# Patient Record
Sex: FEMALE | Race: OTHER | NOT HISPANIC OR LATINO
[De-identification: names, ages, dates, MRNs, and addresses within clinical notes are randomized per-mention and may not be internally consistent; named-entity substitution may affect disease eponyms.]

---

## 2019-07-15 ENCOUNTER — APPOINTMENT (OUTPATIENT)
Dept: ORTHOPEDIC SURGERY | Facility: CLINIC | Age: 74
End: 2019-07-15
Payer: COMMERCIAL

## 2019-07-15 VITALS — HEIGHT: 63 IN | WEIGHT: 160 LBS | BODY MASS INDEX: 28.35 KG/M2

## 2019-07-15 DIAGNOSIS — Z87.09 PERSONAL HISTORY OF OTHER DISEASES OF THE RESPIRATORY SYSTEM: ICD-10-CM

## 2019-07-15 DIAGNOSIS — Z82.61 FAMILY HISTORY OF ARTHRITIS: ICD-10-CM

## 2019-07-15 DIAGNOSIS — M25.562 PAIN IN RIGHT KNEE: ICD-10-CM

## 2019-07-15 DIAGNOSIS — Z78.9 OTHER SPECIFIED HEALTH STATUS: ICD-10-CM

## 2019-07-15 DIAGNOSIS — M25.561 PAIN IN RIGHT KNEE: ICD-10-CM

## 2019-07-15 PROBLEM — Z00.00 ENCOUNTER FOR PREVENTIVE HEALTH EXAMINATION: Status: ACTIVE | Noted: 2019-07-15

## 2019-07-15 PROCEDURE — 73562 X-RAY EXAM OF KNEE 3: CPT | Mod: 50

## 2019-07-15 PROCEDURE — 20611 DRAIN/INJ JOINT/BURSA W/US: CPT | Mod: 50

## 2019-07-15 PROCEDURE — 99213 OFFICE O/P EST LOW 20 MIN: CPT | Mod: 25

## 2019-07-15 RX ORDER — HYALURONATE SOD, CROSS-LINKED 30 MG/3 ML
30 SYRINGE (ML) INTRAARTICULAR
Qty: 2 | Refills: 0 | Status: ACTIVE | COMMUNITY
Start: 2019-07-15

## 2019-07-15 RX ORDER — THYROID, PORCINE 15 MG/1
15 TABLET ORAL
Refills: 0 | Status: ACTIVE | COMMUNITY

## 2019-07-15 NOTE — PROCEDURE
[de-identified] : PHYSICAL EXAM BILATERAL KNEES\par \par AROM\par RIGHT  0- 125\par LEFT    0- 125 \par \par SPECIAL TESTS\par \par PATELLAR GRIND = GRIND \par DRAWER  = NEG\par LACHMAN = NEG\par MACMURRAY = NEG \par \par MOTOR = GROSSLY INTACT\par SENSORY = GROSSLY INTACT \par \par \par

## 2019-07-15 NOTE — HISTORY OF PRESENT ILLNESS
[Pain Location] : pain [] : right & left knee [8] : a minimum pain level of 8/10 [10] : a maximum pain level of 10/10 [Constant] : ~He/She~ states the symptoms seem to be constant [Walking] : worsened by walking [Ice] : relieved by ice [de-identified] : B LATERAL KNEE PAIN\par CONSTANT DULL SHARP PAIN\par PAIN LEVEL 8-10\par BETTER WITH ICE\par WORSE WITH BENDING WALKING\par STIFFNESS\par \par PRP JANUARY 2019 DID NOT HELP

## 2019-07-15 NOTE — PHYSICAL EXAM
[de-identified] : XRAY LEFT AND RIGHT KNEE:\par 4 views of the knee\par GRADE 2 OA PF, MED, LAT\par No obvious fracture or dislocation. \par Alignment within normal limits\par \par \par

## 2019-07-15 NOTE — REASON FOR VISIT
[Follow-Up Visit] : a follow-up visit for [Knee Pain] : knee pain [FreeTextEntry2] : BI-LATERAL KNEES

## 2019-07-22 ENCOUNTER — APPOINTMENT (OUTPATIENT)
Dept: ORTHOPEDIC SURGERY | Facility: CLINIC | Age: 74
End: 2019-07-22
Payer: COMMERCIAL

## 2019-07-22 VITALS — HEIGHT: 63 IN | WEIGHT: 160 LBS | BODY MASS INDEX: 28.35 KG/M2

## 2019-07-22 PROCEDURE — 20610 DRAIN/INJ JOINT/BURSA W/O US: CPT | Mod: LT

## 2019-07-22 PROCEDURE — 99213 OFFICE O/P EST LOW 20 MIN: CPT | Mod: 25

## 2019-07-22 RX ORDER — DICLOFENAC SODIUM 100 MG/1
100 TABLET, FILM COATED, EXTENDED RELEASE ORAL
Qty: 30 | Refills: 2 | Status: ACTIVE | COMMUNITY
Start: 2019-07-22 | End: 1900-01-01

## 2019-07-22 NOTE — DISCUSSION/SUMMARY
[Medication Risks Reviewed] : Medication risks reviewed [de-identified] : This patient was given a lateral cortisone injection. The patient on diclofenac pressure point or 4 weeks

## 2019-07-22 NOTE — PHYSICAL EXAM
[de-identified] : Left ankle shows mild swelling. Some tenderness laterally. No evidence of instability. Negative anterior drawer. Neurovascular exam is normal

## 2019-07-22 NOTE — REVIEW OF SYSTEMS
[Joint Pain] : joint pain [Arthralgia] : arthralgia [Joint Stiffness] : no joint stiffness [Joint Swelling] : joint swelling [Negative] : Heme/Lymph

## 2019-08-08 ENCOUNTER — OTHER (OUTPATIENT)
Age: 74
End: 2019-08-08

## 2019-08-19 ENCOUNTER — APPOINTMENT (OUTPATIENT)
Dept: ORTHOPEDIC SURGERY | Facility: CLINIC | Age: 74
End: 2019-08-19

## 2019-09-24 ENCOUNTER — OTHER (OUTPATIENT)
Age: 74
End: 2019-09-24

## 2019-09-26 ENCOUNTER — RX RENEWAL (OUTPATIENT)
Age: 74
End: 2019-09-26

## 2019-09-26 RX ORDER — HYALURONATE SOD, CROSS-LINKED 30 MG/3 ML
30 SYRINGE (ML) INTRAARTICULAR
Qty: 1 | Refills: 0 | Status: DISCONTINUED | OUTPATIENT
Start: 2019-09-26 | End: 2019-09-26

## 2019-09-26 RX ORDER — HYALURONATE SOD, CROSS-LINKED 30 MG/3 ML
30 SYRINGE (ML) INTRAARTICULAR
Qty: 1 | Refills: 0 | Status: ACTIVE | OUTPATIENT
Start: 2019-09-26

## 2019-10-21 ENCOUNTER — OTHER (OUTPATIENT)
Age: 74
End: 2019-10-21

## 2019-11-04 ENCOUNTER — APPOINTMENT (OUTPATIENT)
Dept: ORTHOPEDIC SURGERY | Facility: CLINIC | Age: 74
End: 2019-11-04
Payer: COMMERCIAL

## 2019-11-04 PROCEDURE — 99213 OFFICE O/P EST LOW 20 MIN: CPT | Mod: 25

## 2019-11-04 PROCEDURE — 20611 DRAIN/INJ JOINT/BURSA W/US: CPT | Mod: 50

## 2019-11-04 RX ORDER — HYALURONATE SOD, CROSS-LINKED 30 MG/3 ML
30 SYRINGE (ML) INTRAARTICULAR
Qty: 2 | Refills: 0 | Status: ACTIVE | COMMUNITY
Start: 2019-11-04 | End: 1900-01-01

## 2019-11-04 NOTE — HISTORY OF PRESENT ILLNESS
[de-identified] : BILATERAL KNEE PAIN\par FOLLOW UP\par RIGHT WORSE THEN LEFT\par ALL 3 WEEKS AGO\par WORSE WITH GOING DOWN STARS\par SWELLING, BRUISING

## 2020-01-08 ENCOUNTER — OTHER (OUTPATIENT)
Age: 75
End: 2020-01-08

## 2020-01-24 ENCOUNTER — APPOINTMENT (OUTPATIENT)
Dept: ORTHOPEDIC SURGERY | Facility: CLINIC | Age: 75
End: 2020-01-24
Payer: COMMERCIAL

## 2020-01-24 PROCEDURE — 99213 OFFICE O/P EST LOW 20 MIN: CPT | Mod: 25

## 2020-01-24 PROCEDURE — 20611 DRAIN/INJ JOINT/BURSA W/US: CPT | Mod: 50

## 2020-01-24 NOTE — PROCEDURE
[de-identified] : Patient has demonstrated limited relief from NSAIDS, rest, exercises / PT , and after discussion of the risks and benefits, the patient has elected to proceed with a\par n ULTRASOUND GUIDED VISCOSUPPLEMENT injection into the BILATERAL SupeLat PF Knee\par  \par Confirmed that the patient does not have history of prior adverse reactions, active, infections, or relevant allergies. There was no effusion, erythema, or warmth, and the skin was clear\par \par The skin was sterilized with alcohol. Ethyl Chloride was used as a topical anesthetic. Routine sterile technique. \par  \par The KNEE JOINT  was injected utilizing ULTRASOUND GUIDANCE with GEL-ONE 4CC. The injection was completed without complication and a bandage was applied.\par \par The patient tolerated the procedure well and was given post-injection instructions.Rec: Cold therapy, analgesics, avoid heavy activity.\par \par MEDICATION: GEL-ONE 3CC\par \par EFFUSION ASPIRATED  : NONE \par

## 2020-01-24 NOTE — DISCUSSION/SUMMARY
[de-identified] : POST INJECTION INSTRUCTIONS\par \par COLD THERAPY , ANALGESICS PRN\par \par HOME STRETCHING AND EXERCISES QD\par \par START P.T.  2 WEEKS AFTER INJECTION \par \par

## 2020-01-24 NOTE — HISTORY OF PRESENT ILLNESS
[de-identified] : BILATERAL KNEE PAIN\par FOLLOW UP\par FLARE UP DECEMBER 2019\par PREVIOUS CORTISONE INJECTION NOV 4, 2019 HELPFUL FOR 1 MONTH\par PAIN LEVEL 9-10/10\par WORSE GOING DOWN THE STAIRS\par GEL ONE INJECTION TODAY

## 2020-02-05 ENCOUNTER — APPOINTMENT (OUTPATIENT)
Dept: ORTHOPEDIC SURGERY | Facility: CLINIC | Age: 75
End: 2020-02-05
Payer: COMMERCIAL

## 2020-02-05 VITALS — WEIGHT: 160 LBS | HEIGHT: 63 IN | BODY MASS INDEX: 28.35 KG/M2

## 2020-02-05 DIAGNOSIS — M25.512 PAIN IN RIGHT SHOULDER: ICD-10-CM

## 2020-02-05 DIAGNOSIS — M25.511 PAIN IN RIGHT SHOULDER: ICD-10-CM

## 2020-02-05 PROCEDURE — 20611 DRAIN/INJ JOINT/BURSA W/US: CPT | Mod: 50

## 2020-02-05 PROCEDURE — 99214 OFFICE O/P EST MOD 30 MIN: CPT | Mod: 25

## 2020-02-05 PROCEDURE — 73030 X-RAY EXAM OF SHOULDER: CPT | Mod: 50

## 2020-02-05 NOTE — PHYSICAL EXAM
[de-identified] : PHYSICAL EXAM LEFT AND RIGHT   SHOULDER\par \par LEFT  SCAPULAR PROTRACTION\par AROM \par LEFT  120 / 120 / 70 / 0\par RIGHT   130 / 130 / 80 / 20\par TENDER: SA REGION / AC JOINT / GH JOINT / BICEPS GROOVE\par \par SPECIAL TESTING :\par SWANN - POSITIVE \par ROULA - POSITIVE \par SPEED TEST - POSITIVE\par \par NERI - NEGATIVE \par APPREHENSION AND SUPPRESSION - NEGATIVE \par \par RC STRENGTH TESTING \par SS:  5/5\par SUB 5/5\par IS     5/5\par BICEPS  5/5\par \par SENSATION  - GROSSLY INTACT\par \par \par  [de-identified] : LEFT SHOULDER XRAY -  \par NO OBVIOUS FRACTURE , NO SIGNIFICANT OSTEOARTHRITIS, SEPARATION OR DISLOCATION \par TYPE 2B ACROMION \par CSA=35\par \par RIGHT SHOULDER XRAY -  \par NO OBVIOUS FRACTURE , NO SIGNIFICANT OSTEOARTHRITIS, SEPARATION OR DISLOCATION \par TYPE 2B ACROMION \par CSA=32\par

## 2020-02-05 NOTE — HISTORY OF PRESENT ILLNESS
[de-identified] : BILATERAL SHOULDER PAIN\par NOVEMBER 2019 FELL - LEFT SHOULDER HIT DOOR \par LEFT WORSE THEN RIGHT\par PAIN LEVEL 10/10\par WORSE WITH LYING DOWN, REACHING ACROSS AND BEHIND, LIFTING\par BETTER WITH ICE\par STIFFNESS\par

## 2020-02-05 NOTE — DISCUSSION/SUMMARY
[de-identified] : POST INJECTION INSTRUCTIONS\par \par COLD THERAPY , ANALGESICS PRN\par \par HOME STRETCHING AND EXERCISES QD \par \par MRI RIGHT AND LEFT SHOULDERS\par

## 2020-02-05 NOTE — PROCEDURE
[de-identified] : INJECTION RIGHT AND LEFT SHOULDER SA SPACE\par \par Patient has demonstrated limited relief from NSAIDS, rest, exercises / PT, and after discussion of the risks and benefits, the patient has elected to proceed with an ULTRASOUND GUIDED injection into the RIGHT AND LEFT  SUBACROMIAL  SPACE LATERAL APPROACH \par  \par Confirmed that the patient does not have history of prior adverse reactions, active, infections, or relevant allergies. There was no effusion, erythema, or warmth, and the skin was clear\par \par The skin was sterilized with alcohol. Ethyl Chloride was used as a topical anesthetic. Routine sterile technique. \par The site was injected UTILIZING ULTRASOUND GUIDANCE to confirm appropriate placement of the needle-\par with a mixture of medication and local anesthetic. The injection was completed without complication and a bandage was applied.\par  \par The patient tolerated the procedure well and was given post-injection instructions.Rec: Cold therapy, analgesics, avoid heavy activity.\par MEDICATION: 4cc of 1% xylocaine + 10mg of KENALOG\par

## 2020-07-08 ENCOUNTER — APPOINTMENT (OUTPATIENT)
Dept: ORTHOPEDIC SURGERY | Facility: CLINIC | Age: 75
End: 2020-07-08
Payer: COMMERCIAL

## 2020-07-08 VITALS — WEIGHT: 160 LBS | BODY MASS INDEX: 28.35 KG/M2 | HEIGHT: 63 IN

## 2020-07-08 VITALS — TEMPERATURE: 97.8 F

## 2020-07-08 DIAGNOSIS — S49.91XA UNSPECIFIED INJURY OF RIGHT SHOULDER AND UPPER ARM, INITIAL ENCOUNTER: ICD-10-CM

## 2020-07-08 DIAGNOSIS — S49.92XA UNSPECIFIED INJURY OF LEFT SHOULDER AND UPPER ARM, INITIAL ENCOUNTER: ICD-10-CM

## 2020-07-08 PROCEDURE — 99214 OFFICE O/P EST MOD 30 MIN: CPT | Mod: 25

## 2020-07-08 PROCEDURE — 20611 DRAIN/INJ JOINT/BURSA W/US: CPT | Mod: 50

## 2020-07-08 NOTE — PHYSICAL EXAM
[de-identified] : PHYSICAL EXAM LEFT AND RIGHT   SHOULDER\par \par LEFT  SCAPULAR PROTRACTION\par AROM \par LEFT  120 / 120 / 70 / 0\par RIGHT   130 / 130 / 80 / 20\par TENDER: SA REGION / AC JOINT / GH JOINT / BICEPS GROOVE\par \par SPECIAL TESTING :\par SWANN - POSITIVE \par ROULA - POSITIVE \par SPEED TEST - POSITIVE\par \par NERI - NEGATIVE \par APPREHENSION AND SUPPRESSION - NEGATIVE \par \par RC STRENGTH TESTING \par SS:  5/5\par SUB 5/5\par IS     5/5\par BICEPS  5/5\par \par SENSATION  - GROSSLY INTACT\par \par \par

## 2020-07-08 NOTE — HISTORY OF PRESENT ILLNESS
[de-identified] : WAS UNABLE TO GET MRI - AUTHO ISSUES \par \par BILATERAL SHOULDER PAIN\par NOVEMBER 2019 FELL - LEFT SHOULDER HIT DOOR \par LEFT WORSE THEN RIGHT\par PAIN LEVEL 10/10\par WORSE WITH LYING DOWN, REACHING ACROSS AND BEHIND, LIFTING\par BETTER WITH ICE\par STIFFNESS\par

## 2020-07-08 NOTE — DISCUSSION/SUMMARY
[de-identified] : POST INJECTION INSTRUCTIONS\par \par COLD THERAPY , ANALGESICS PRN\par \par HOME STRETCHING AND EXERCISES QD\par \par FOLLOW UP AFTER MRI

## 2020-07-08 NOTE — PROCEDURE
[de-identified] : INJECTION RIGHT AND LEFT SHOULDER SA SPACE\par \par Patient has demonstrated limited relief from NSAIDS, rest, exercises / PT, and after discussion of the risks and benefits, the patient has elected to proceed with an ULTRASOUND GUIDED injection into the RIGHT AND LEFT  SUBACROMIAL  SPACE LATERAL APPROACH \par  \par Confirmed that the patient does not have history of prior adverse reactions, active, infections, or relevant allergies. There was no effusion, erythema, or warmth, and the skin was clear\par \par The skin was sterilized with alcohol. Ethyl Chloride was used as a topical anesthetic. Routine sterile technique. \par The site was injected UTILIZING ULTRASOUND GUIDANCE to confirm appropriate placement of the needle-\par with a mixture of medication and local anesthetic. The injection was completed without complication and a bandage was applied.\par  \par The patient tolerated the procedure well and was given post-injection instructions.Rec: Cold therapy, analgesics, avoid heavy activity.\par MEDICATION: 4cc of 1% xylocaine + 10mg of KENALOG\par

## 2020-07-29 ENCOUNTER — APPOINTMENT (OUTPATIENT)
Dept: ORTHOPEDIC SURGERY | Facility: CLINIC | Age: 75
End: 2020-07-29
Payer: COMMERCIAL

## 2020-07-29 VITALS — TEMPERATURE: 97 F

## 2020-07-29 PROCEDURE — 99213 OFFICE O/P EST LOW 20 MIN: CPT | Mod: 25

## 2020-07-29 PROCEDURE — 20611 DRAIN/INJ JOINT/BURSA W/US: CPT | Mod: LT

## 2020-07-29 NOTE — HISTORY OF PRESENT ILLNESS
[de-identified] : \par BILATERAL SHOULDER PAIN\par NOVEMBER 2019 FELL - LEFT SHOULDER HIT DOOR \par LEFT WORSE THEN RIGHT\par IMPROVING\par WORSE WITH LYING DOWN, REACHING ACROSS AND BEHIND, LIFTING\par BETTER WITH ICE\par STIFFNESS\par

## 2020-07-29 NOTE — PROCEDURE
[de-identified] : INJECTION LEFT SHOULDER SA SPACE\par \par Patient has demonstrated limited relief from NSAIDS, rest, exercises / PT, and after discussion of the risks and benefits, the patient has elected to proceed with an ULTRASOUND GUIDED injection into the LEFT SUBACROMIAL  SPACE LATERAL APPROACH \par  \par Confirmed that the patient does not have history of prior adverse reactions, active, infections, or relevant allergies. There was no effusion, erythema, or warmth, and the skin was clear\par \par The skin was sterilized with alcohol. Ethyl Chloride was used as a topical anesthetic. Routine sterile technique. \par The site was injected UTILIZING ULTRASOUND GUIDANCE to confirm appropriate placement of the needle-\par with a mixture of medication and local anesthetic. The injection was completed without complication and a bandage was applied.\par  \par The patient tolerated the procedure well and was given post-injection instructions.Rec: Cold therapy, analgesics, avoid heavy activity.\par MEDICATION: 4cc of 1% xylocaine + 60mg KETOROLAC \par \par

## 2020-07-29 NOTE — PHYSICAL EXAM
[de-identified] : PHYSICAL EXAM LEFT AND RIGHT   SHOULDER\par \par LEFT  SCAPULAR PROTRACTION\par AROM \par LEFT  120 / 120 / 70 / 0\par RIGHT   130 / 130 / 80 / 20\par TENDER: SA REGION / AC JOINT / GH JOINT / BICEPS GROOVE\par \par SPECIAL TESTING :\par SWANN - POSITIVE \par ROULA - POSITIVE \par SPEED TEST - POSITIVE\par \par NERI - NEGATIVE \par APPREHENSION AND SUPPRESSION - NEGATIVE \par \par RC STRENGTH TESTING \par SS:  5/5\par SUB 5/5\par IS     5/5\par BICEPS  5/5\par \par SENSATION  - GROSSLY INTACT\par \par \par

## 2020-07-30 ENCOUNTER — TRANSCRIPTION ENCOUNTER (OUTPATIENT)
Age: 75
End: 2020-07-30

## 2020-08-06 ENCOUNTER — APPOINTMENT (OUTPATIENT)
Dept: ORTHOPEDIC SURGERY | Facility: CLINIC | Age: 75
End: 2020-08-06
Payer: COMMERCIAL

## 2020-08-06 PROCEDURE — 20611 DRAIN/INJ JOINT/BURSA W/US: CPT | Mod: RT

## 2020-08-06 PROCEDURE — 99214 OFFICE O/P EST MOD 30 MIN: CPT | Mod: 25

## 2020-08-06 NOTE — PHYSICAL EXAM
[de-identified] : PHYSICAL EXAM LEFT AND RIGHT   SHOULDER\par \par LEFT  SCAPULAR PROTRACTION\par AROM \par LEFT  120 / 120 / 70 / 0\par RIGHT   130 / 130 / 80 / 20\par TENDER: SA REGION / AC JOINT / GH JOINT / BICEPS GROOVE\par \par SPECIAL TESTING :\par SWANN - POSITIVE \par ROULA - POSITIVE \par SPEED TEST - POSITIVE\par \par NERI - NEGATIVE \par APPREHENSION AND SUPPRESSION - NEGATIVE \par \par RC STRENGTH TESTING \par SS:  5/5\par SUB 5/5\par IS     5/5\par BICEPS  5/5\par \par SENSATION  - GROSSLY INTACT\par \par \par

## 2020-08-06 NOTE — PROCEDURE
[de-identified] : INJECTION RIGHT SHOULDER SA SPACE\par \par Patient has demonstrated limited relief from NSAIDS, rest, exercises / PT, and after discussion of the risks and benefits, the patient has elected to proceed with an ULTRASOUND GUIDED injection into the RIGHT SUBACROMIAL  SPACE LATERAL APPROACH \par  \par Confirmed that the patient does not have history of prior adverse reactions, active, infections, or relevant allergies. There was no effusion, erythema, or warmth, and the skin was clear\par \par The skin was sterilized with alcohol. Ethyl Chloride was used as a topical anesthetic. Routine sterile technique. \par The site was injected UTILIZING ULTRASOUND GUIDANCE to confirm appropriate placement of the needle-\par with a mixture of medication and local anesthetic. The injection was completed without complication and a bandage was applied.\par  \par The patient tolerated the procedure well and was given post-injection instructions.Rec: Cold therapy, analgesics, avoid heavy activity.\par MEDICATION: 4cc of 1% xylocaine + 60MG KETOROLAC \par

## 2020-08-06 NOTE — HISTORY OF PRESENT ILLNESS
[de-identified] : BILATERAL SHOULDER PAIN\par RIGHT WORSE THEN LEFT\par LEFT SHOULDER PRE OP VISIT \par PAIN LEVEL LEFT 8/10\par PAIN LEVEL RIGHT 10/10\par ICE AND MEDICATION AS NEEDED

## 2020-08-06 NOTE — DISCUSSION/SUMMARY
[de-identified] : PREOP SHOULDER SURGERY DISCUSSION:\par \par PROCEDURE DISCUSSED - QUESTIONS ANSWERED\par PATIENT WISHES TO PROCEED\par \par POST OP CARE AND LIMITATIONS REVIEWED - HANDOUT PROVIDED \par \par COLD PACKS RECOMMENDED\par ANALGESICS PRESCRIBED \par \par \par THERE ARE NO GUARANTEES THAT ALL SYMPTOMS WILL BE ALLEVIATED  \par SHOULDER ARTHROSCOPY, ACROMIOPLASTY, DEBRIDEMENT,  RC REPAIR AND LABRUM REPAIRS- ON AVERAGE 75- 85% SATISFACTORY RESULTS FOR TEARS < 3CM AFTER 9-12 MONTHS HEALING AND REHABILITATION. \par \par REPAIRS WILL REQUIRE STRICT SHOULDER IMMOBILIZER 4-6 WEEKS\par \par RC TEARS 3CM OR LARGER MAY REQUIRE COLLAGEN PATCH AUGMENTATION GENERALLY HAVE LESS SATISFACTORY RESULTS\par \par PHYSICAL THERAPY REQUIRED 2X WEEK FOR  MINIMUM 8-12 WEEKS FOR ALL PROCEDURES \par CONTINUED HOME EXERCISES 6-9 MONTHS AFTER THAT REQUIRED FOR OPTIMAL OUTCOMES \par \par ROUTINE SURGICAL AND ANESTHETIC RISKS INCLUDE RISK OF SURGICAL INFECTION, ANESTHETIC COMPLICATION OR ALLERGY, POSSIBLE RETEARS OR PROGRESSION OF TEAR, STIFFNESS OF SHOULDER AND UNSATISFACTORY OUTCOMES\par \par PATIENT UNDERSTANDS AND WISHES TO PROCEED\par

## 2020-08-11 ENCOUNTER — APPOINTMENT (OUTPATIENT)
Dept: ORTHOPEDIC SURGERY | Facility: AMBULATORY SURGERY CENTER | Age: 75
End: 2020-08-11
Payer: COMMERCIAL

## 2020-08-11 PROCEDURE — 29823 SHO ARTHRS SRG XTNSV DBRDMT: CPT | Mod: RT,59

## 2020-08-11 PROCEDURE — 29820 SHO ARTHRS SRG PRTL SYNVCT: CPT | Mod: RT,59

## 2020-08-11 PROCEDURE — 29827 SHO ARTHRS SRG RT8TR CUF RPR: CPT | Mod: RT

## 2020-08-11 PROCEDURE — 29826 SHO ARTHRS SRG DECOMPRESSION: CPT | Mod: RT,59

## 2020-08-11 PROCEDURE — 29825 SHO ARTHRS SRG LSS&RESCJ ADS: CPT | Mod: RT,59

## 2020-08-13 ENCOUNTER — APPOINTMENT (OUTPATIENT)
Dept: ORTHOPEDIC SURGERY | Facility: CLINIC | Age: 75
End: 2020-08-13
Payer: COMMERCIAL

## 2020-08-13 VITALS — TEMPERATURE: 96.9 F

## 2020-08-13 PROCEDURE — 73030 X-RAY EXAM OF SHOULDER: CPT | Mod: LT

## 2020-08-13 PROCEDURE — 99024 POSTOP FOLLOW-UP VISIT: CPT

## 2020-08-13 RX ORDER — DOCUSATE SODIUM - SENNOSIDES 50; 8.6 MG/1; MG/1
8.6-5 TABLET, FILM COATED ORAL TWICE DAILY
Qty: 60 | Refills: 2 | Status: ACTIVE | COMMUNITY
Start: 2020-08-13 | End: 1900-01-01

## 2020-08-13 NOTE — DISCUSSION/SUMMARY
[de-identified] : POST OP REPAIR:\par \par COLD THERAPY,ANALGESICS AS NEEDED\par \par SHOULDER IMMOBILIZED FULL TIME X 6 WEEKS - STRICT NO AROM - DESKTOP WORK ALLOWED\par \par START PENDULUM EXERCISES 3 WEEK POSTOP\par \par START AAROM FLEXION, PULLEY  6 WEEKS POST OP\par \par FU 6 WEEKS - ADVANCE TO P.T.\par

## 2020-08-13 NOTE — HISTORY OF PRESENT ILLNESS
[de-identified] : LEFT SHOULDER\par FOLLOW UP\par AUGUST 11, 2020 - LEFT 1.25CM RC REPAIR, REX\par POST OP- 2 DAYS\par PAIN LEVEL 8/10\par NO NUMBNESS, TINGLING\par GENERAL WEAKNESS\par MILD SWELLING

## 2020-08-13 NOTE — PHYSICAL EXAM
[de-identified] : POST OP SHOULDER EXAM \par AUGUST 11, 2020 - LEFT 1.25CM RC REPAIR, REX\par \par PORTALS HEALING WELL - NO ERYTHEMA OR CALOR\par SUTURES REMOVED, STERISTRIPS AND WATERPROOF BANDAIDS  APPLIED \par \par AROM  NT\par \par DISTAL CMS INTACT\par  [de-identified] : LEFT SHOULDER XRAY (2 VIEWS - AP AND OUTLET)  -  \par NO OBVIOUS FRACTURE OR DISLOCATION, \par SATISFACTORY DECOMPRESSION NOTED  , \par ANCHOR SILHOUETTE VISIBLE IN GREATER TUBEROSITY- SATISFACTORY POSITION\par

## 2020-09-03 ENCOUNTER — APPOINTMENT (OUTPATIENT)
Dept: ORTHOPEDIC SURGERY | Facility: CLINIC | Age: 75
End: 2020-09-03
Payer: COMMERCIAL

## 2020-09-03 PROCEDURE — 20611 DRAIN/INJ JOINT/BURSA W/US: CPT | Mod: 79,RT

## 2020-09-03 PROCEDURE — 99214 OFFICE O/P EST MOD 30 MIN: CPT | Mod: 24,25

## 2020-09-03 NOTE — PHYSICAL EXAM
[de-identified] : POST OP SHOULDER EXAM \par AUGUST 11, 2020 - LEFT 1.25CM RC REPAIR, REX\par \par PORTALS HEALING WELL - NO ERYTHEMA OR CALOR\par \par AAROM - PENDULUM\par \par DISTAL CMS INTACT\par

## 2020-09-03 NOTE — DISCUSSION/SUMMARY
[de-identified] : POST OP REPAIR:\par \par COLD THERAPY,ANALGESICS AS NEEDED\par \par SHOULDER IMMOBILIZED FULL TIME X 6 WEEKS - STRICT NO AROM - DESKTOP WORK ALLOWED\par \par START PENDULUM EXERCISES 3 WEEK POSTOP\par \par START AAROM FLEXION, PULLEY  6 WEEKS POST OP\par \par FU 6 WEEKS - ADVANCE TO P.T.\par

## 2020-09-03 NOTE — PROCEDURE
[de-identified] : INJECTION RIGHT SHOULDER SA SPACE\par \par Patient has demonstrated limited relief from NSAIDS, rest, exercises / PT, and after discussion of the risks and benefits, the patient has elected to proceed with an ULTRASOUND GUIDED injection into the RIGHT SUBACROMIAL  SPACE LATERAL APPROACH \par  \par Confirmed that the patient does not have history of prior adverse reactions, active, infections, or relevant allergies. There was no effusion, erythema, or warmth, and the skin was clear\par \par The skin was sterilized with alcohol. Ethyl Chloride was used as a topical anesthetic. Routine sterile technique. \par The site was injected UTILIZING ULTRASOUND GUIDANCE to confirm appropriate placement of the needle-\par with a mixture of medication and local anesthetic. The injection was completed without complication and a bandage was applied.\par  \par The patient tolerated the procedure well and was given post-injection instructions.Rec: Cold therapy, analgesics, avoid heavy activity.\par MEDICATION: 4cc of 1% xylocaine + 10mg of KENALOG\par \par

## 2020-09-03 NOTE — HISTORY OF PRESENT ILLNESS
[de-identified] : LEFT SHOULDER\par FOLLOW UP\par AUGUST 11, 2020 - LEFT 1.25CM RC REPAIR, REX\par POST OP- 3 WEEKS\par PAIN LEVEL 7/10\par SORENESS\par \par RIGHT SHOULDER GETTING WORSE - KENALOG INJECTION JULY 2020 WAS HELPFUL\par

## 2020-09-24 ENCOUNTER — APPOINTMENT (OUTPATIENT)
Dept: ORTHOPEDIC SURGERY | Facility: CLINIC | Age: 75
End: 2020-09-24
Payer: COMMERCIAL

## 2020-09-24 PROCEDURE — 20552 NJX 1/MLT TRIGGER POINT 1/2: CPT | Mod: 58

## 2020-09-24 PROCEDURE — 99024 POSTOP FOLLOW-UP VISIT: CPT

## 2020-09-24 NOTE — DISCUSSION/SUMMARY
[de-identified] : POST OP REPAIR:\par AUGUST 11, 2020 - LEFT 1.25CM RC REPAIR, REX\par \par COLD THERAPY,ANALGESICS AS NEEDED\par \par SHOULDER IMMOBILIZED FULL TIME X 6 WEEKS - STRICT NO AROM - DESKTOP WORK ALLOWED\par \par START PENDULUM EXERCISES 3 WEEK POSTOP\par \par START AAROM FLEXION, PULLEY  6 WEEKS POST OP\par \par  6 WEEKS - ADVANCE TO P.T.\par

## 2020-09-24 NOTE — PHYSICAL EXAM
[de-identified] : POST OP SHOULDER EXAM \par AUGUST 11, 2020 - LEFT 1.25CM RC REPAIR, REX\par \par PORTALS HEALING WELL - NO ERYTHEMA OR CALOR\par \par AAROM - 90 \par \par DISTAL CMS INTACT\par

## 2020-09-24 NOTE — PROCEDURE
[de-identified] : TRIGGER POINT INJECTIONS\par \par Patient has demonstrated limited relief from NSAIDS, rest, exercises / PT, and after discussion of the risks and benefits, the patient elected to proceed with a trigger point injection into the \par LEFT SUPRASPINATUS FOSSA\par \par  I confirmed no prior adverse reactions, no active infections, and no relevant allergies. \par The skin was prepped in the usual sterile manner. The site was injected with local anesthetic followed by local needling. \par The injection was completed without complication and a bandage was applied.\par  \par The patient tolerated the procedure well and was given post-injection instructions. Cold Tx x 48 hours, analgesics. prn \par Medications: 1.5cc of 1% xylocaine + 1.5cc .25% Bupivicaine + KENALOG 1MG  per site\par

## 2020-09-24 NOTE — HISTORY OF PRESENT ILLNESS
[de-identified] : LEFT SHOULDER\par FOLLOW UP\par AUGUST 11, 2020 - LEFT 1.25CM RC REPAIR, REX\par POST OP- 6 WEEKS\par PAIN LEVEL 8-9/10\par WORSE IN THE LAST 2 NIGHTS\par PAIN IN THE BACK OF SHOULDER\par \par RIGHT SHOULDER GETTING WORSE - KENALOG INJECTION JULY 2020 WAS HELPFUL\par

## 2020-10-14 ENCOUNTER — APPOINTMENT (OUTPATIENT)
Dept: ORTHOPEDIC SURGERY | Facility: CLINIC | Age: 75
End: 2020-10-14
Payer: COMMERCIAL

## 2020-10-14 VITALS — TEMPERATURE: 96.9 F

## 2020-10-14 PROCEDURE — 20553 NJX 1/MLT TRIGGER POINTS 3/>: CPT | Mod: 79

## 2020-10-14 PROCEDURE — 99213 OFFICE O/P EST LOW 20 MIN: CPT | Mod: 24,25

## 2020-10-14 NOTE — PROCEDURE
[de-identified] : TRIGGER POINT INJECTIONS\par \par Patient has demonstrated limited relief from NSAIDS, rest, exercises / PT, and after discussion of the risks and benefits, the patient elected to proceed with a trigger point injection into the \par LEFT AND RIGHT LEVATOR AND RHOMBOID X 2 \par \par  I confirmed no prior adverse reactions, no active infections, and no relevant allergies. \par The skin was prepped in the usual sterile manner. The site was injected with local anesthetic followed by local needling. \par The injection was completed without complication and a bandage was applied.\par  \par The patient tolerated the procedure well and was given post-injection instructions. Cold Tx x 48 hours, analgesics. prn \par Medications: 1.5cc of 1% xylocaine + 1.5cc .25% Bupivicaine + KENALOG 1MG  per site\par

## 2020-10-14 NOTE — DISCUSSION/SUMMARY
[de-identified] : POST INJECTION INSTRUCTIONS\par \par COLD THERAPY , ANALGESICS PRN\par \par HOME STRETCHING AND EXERCISES QD\par \par CONTINUE PT

## 2020-10-14 NOTE — PHYSICAL EXAM
[de-identified] : POST OP SHOULDER EXAM \par AUGUST 11, 2020 - LEFT 1.25CM RC REPAIR, REX\par \par PORTALS HEALING WELL - NO ERYTHEMA OR CALOR\par \par AAROM - 110\par \par DISTAL CMS INTACT\par

## 2020-10-14 NOTE — HISTORY OF PRESENT ILLNESS
[de-identified] : BILATERAL SHOULDER PAIN\par FOLLOW UP\par LEFT SHOULDER -AUGUST 11, 2020 - LEFT 1.25CM RC REPAIR, REX\par PAIN LEVEL LEFT 10/10 FLARE UP 3-4 DAYS AGO\par HAS DONE 2 SESSIONS OF PT\par \par RIGHT SHOULDER\par PAIN LEVEL 9-10/10\par INTERMITTENT PAIN\par PREVIOUS CORTISONE INJECTION HELPFUL \par \par TRIGGER POINTS HELPFUL FOR 2 WEEKS

## 2020-10-29 ENCOUNTER — APPOINTMENT (OUTPATIENT)
Dept: ORTHOPEDIC SURGERY | Facility: CLINIC | Age: 75
End: 2020-10-29
Payer: COMMERCIAL

## 2020-10-29 VITALS — TEMPERATURE: 94.6 F

## 2020-10-29 PROCEDURE — 99024 POSTOP FOLLOW-UP VISIT: CPT

## 2020-10-29 PROCEDURE — 20553 NJX 1/MLT TRIGGER POINTS 3/>: CPT | Mod: 58

## 2020-10-29 NOTE — DISCUSSION/SUMMARY
[de-identified] : POST INJECTION INSTRUCTIONS\par \par COLD THERAPY , ANALGESICS PRN\par \par HOME STRETCHING AND EXERCISES QD\par \par CONTINUE PT

## 2020-10-29 NOTE — PHYSICAL EXAM
[de-identified] : POST OP SHOULDER EXAM \par AUGUST 11, 2020 - LEFT 1.25CM RC REPAIR, REX\par \par PORTALS HEALING WELL - NO ERYTHEMA OR CALOR\par \par AAROM - 110\par \par DISTAL CMS INTACT\par

## 2020-10-29 NOTE — PROCEDURE
[de-identified] : TRIGGER POINT INJECTIONS\par \par Patient has demonstrated limited relief from NSAIDS, rest, exercises / PT, and after discussion of the risks and benefits, the patient elected to proceed with a trigger point injection into the \par LEFT AND RIGHT LEVATOR AND RHOMBOID X 2 \par \par  I confirmed no prior adverse reactions, no active infections, and no relevant allergies. \par The skin was prepped in the usual sterile manner. The site was injected with local anesthetic followed by local needling. \par The injection was completed without complication and a bandage was applied.\par  \par The patient tolerated the procedure well and was given post-injection instructions. Cold Tx x 48 hours, analgesics. prn \par Medications: 1.5cc of 1% xylocaine + 1.5cc .25% Bupivicaine + KENALOG 1MG  per site\par

## 2020-10-29 NOTE — HISTORY OF PRESENT ILLNESS
[de-identified] : BILATERAL SHOULDER PAIN\par FOLLOW UP\par FLARE UP 2 DAYS AGO\par PAIN LEVEL 9/10\par TRIGGER POINT INJECTIONS 10/14/20- HELPFL FOR 1.5 WEEKS

## 2020-11-12 ENCOUNTER — APPOINTMENT (OUTPATIENT)
Dept: ORTHOPEDIC SURGERY | Facility: CLINIC | Age: 75
End: 2020-11-12
Payer: COMMERCIAL

## 2020-11-12 PROCEDURE — 20553 NJX 1/MLT TRIGGER POINTS 3/>: CPT

## 2020-11-12 PROCEDURE — 99213 OFFICE O/P EST LOW 20 MIN: CPT | Mod: 25

## 2020-11-12 PROCEDURE — 99072 ADDL SUPL MATRL&STAF TM PHE: CPT

## 2020-11-12 NOTE — DISCUSSION/SUMMARY
[de-identified] : POST INJECTION INSTRUCTIONS\par \par COLD THERAPY , ANALGESICS PRN\par \par HOME STRETCHING AND EXERCISES QD\par \par CONTINUE PT

## 2020-11-12 NOTE — PROCEDURE
[de-identified] : TRIGGER POINT INJECTIONS\par \par Patient has demonstrated limited relief from NSAIDS, rest, exercises / PT, and after discussion of the risks and benefits, the patient elected to proceed with a trigger point injection into the \par LEFT AND RIGHT LEVATOR AND RHOMBOID X 2 \par \par  I confirmed no prior adverse reactions, no active infections, and no relevant allergies. \par The skin was prepped in the usual sterile manner. The site was injected with local anesthetic followed by local needling. \par The injection was completed without complication and a bandage was applied.\par  \par The patient tolerated the procedure well and was given post-injection instructions. Cold Tx x 48 hours, analgesics. prn \par Medications: 1.5cc of 1% xylocaine + 1.5cc .25% Bupivicaine + KENALOG 1MG  per site\par

## 2020-11-12 NOTE — HISTORY OF PRESENT ILLNESS
[de-identified] : BILATERAL SHOULDER PAIN\par FOLLOW UP\par LEFT WORSE THEN RIGHT \par LEFT AUGUST 11, 2020 - LEFT 1.25CM RC REPAIR, REX\par TRIGGER POINT INJECTIONS 10/14/20- HELPFUL FOR 1.5 WEEKS

## 2020-11-12 NOTE — PHYSICAL EXAM
[de-identified] : POST OP SHOULDER EXAM \par AUGUST 11, 2020 - LEFT 1.25CM RC REPAIR, REX\par \par PORTALS HEALING WELL - NO ERYTHEMA OR CALOR\par \par AROM - 120 / 120\par \par DISTAL CMS INTACT\par

## 2020-12-03 ENCOUNTER — APPOINTMENT (OUTPATIENT)
Dept: ORTHOPEDIC SURGERY | Facility: CLINIC | Age: 75
End: 2020-12-03
Payer: COMMERCIAL

## 2020-12-03 PROCEDURE — 20552 NJX 1/MLT TRIGGER POINT 1/2: CPT

## 2020-12-03 PROCEDURE — 99214 OFFICE O/P EST MOD 30 MIN: CPT | Mod: 25

## 2020-12-03 PROCEDURE — 99072 ADDL SUPL MATRL&STAF TM PHE: CPT

## 2020-12-03 NOTE — HISTORY OF PRESENT ILLNESS
[de-identified] : BILATERAL SHOULDER PAIN\par FOLLOW UP\par LEFT WORSE THEN RIGHT\par LEFT PAIN LEVEL 10/10\par RIGHT PAIN LEVEL 10/10\par LEFT AUGUST 11, 2020 - LEFT 1.25CM RC REPAIR, REX\par TRIGGER POINT INJECTIONS 10/14/20- HELPFUL FOR 1.5 WEEKS \par TRIGGER POINT INJECTIONS 11/12/2020- HELPFUL FOR 3 WEEKS OF RELIEF

## 2020-12-03 NOTE — DISCUSSION/SUMMARY
[de-identified] : POST INJECTION INSTRUCTIONS\par \par COLD THERAPY , ANALGESICS PRN\par \par HOME STRETCHING AND EXERCISES QD\par \par CONTINUE PT

## 2020-12-03 NOTE — PROCEDURE
[de-identified] : INJECTION RIGHT SHOULDER SA SPACE\par \par Patient has demonstrated limited relief from NSAIDS, rest, exercises / PT, and after discussion of the risks and benefits, the patient has elected to proceed with an ULTRASOUND GUIDED injection into the RIGHT SUBACROMIAL SPACE LATERAL APPROACH \par  \par Confirmed that the patient does not have history of prior adverse reactions, active, infections, or relevant allergies. There was no effusion, erythema, or warmth, and the skin was clear\par \par The skin was sterilized with alcohol. Ethyl Chloride was used as a topical anesthetic. Routine sterile technique. \par The site was injected UTILIZING ULTRASOUND GUIDANCE to confirm appropriate placement of the needle-\par with a mixture of medication and local anesthetic. The injection was completed without complication and a bandage was applied.\par  \par The patient tolerated the procedure well and was given post-injection instructions.Rec: Cold therapy, analgesics, avoid heavy activity.\par MEDICATION: 4cc of 1% xylocaine + 10mg of KENALOG\par \par \par TRIGGER POINT INJECTIONS\par \par Patient has demonstrated limited relief from NSAIDS, rest, exercises / PT, and after discussion of the risks and benefits, the patient elected to proceed with a trigger point injection into the \par LEFT TRAPEZIUS AT SCAP SPINE \par \par  I confirmed no prior adverse reactions, no active infections, and no relevant allergies. \par The skin was prepped in the usual sterile manner. The site was injected with local anesthetic followed by local needling. \par The injection was completed without complication and a bandage was applied.\par  \par The patient tolerated the procedure well and was given post-injection instructions. Cold Tx x 48 hours, analgesics. prn \par Medications: 1.5cc of 1% xylocaine + 1.5cc.25% Bupivicaine + KENALOG 1MG per site

## 2020-12-03 NOTE — PHYSICAL EXAM
[de-identified] : POST OP LEFT SHOULDER EXAM \par AUGUST 11, 2020 - LEFT 1.25CM RC REPAIR, REX\par \par PORTALS HEALING WELL - NO ERYTHEMA OR CALOR\par TENDER CREPITUS TRAPEZIUS TRIGGER \par \par AROM - 130 / 120\par \par DISTAL CMS INTACT\par

## 2020-12-17 ENCOUNTER — APPOINTMENT (OUTPATIENT)
Dept: ORTHOPEDIC SURGERY | Facility: CLINIC | Age: 75
End: 2020-12-17
Payer: COMMERCIAL

## 2020-12-17 PROCEDURE — 20611 DRAIN/INJ JOINT/BURSA W/US: CPT | Mod: 50

## 2020-12-17 PROCEDURE — 20552 NJX 1/MLT TRIGGER POINT 1/2: CPT | Mod: 59

## 2020-12-17 PROCEDURE — 99214 OFFICE O/P EST MOD 30 MIN: CPT | Mod: 25

## 2020-12-17 PROCEDURE — 99072 ADDL SUPL MATRL&STAF TM PHE: CPT

## 2020-12-17 RX ORDER — OXYCODONE AND ACETAMINOPHEN 7.5; 325 MG/1; MG/1
7.5-325 TABLET ORAL
Qty: 42 | Refills: 0 | Status: ACTIVE | COMMUNITY
Start: 2020-08-06 | End: 1900-01-01

## 2021-01-07 ENCOUNTER — APPOINTMENT (OUTPATIENT)
Dept: ORTHOPEDIC SURGERY | Facility: CLINIC | Age: 76
End: 2021-01-07
Payer: COMMERCIAL

## 2021-01-07 PROCEDURE — 99072 ADDL SUPL MATRL&STAF TM PHE: CPT

## 2021-01-07 PROCEDURE — 99213 OFFICE O/P EST LOW 20 MIN: CPT | Mod: 25

## 2021-01-07 PROCEDURE — 20553 NJX 1/MLT TRIGGER POINTS 3/>: CPT

## 2021-01-07 NOTE — PHYSICAL EXAM
[de-identified] : POST OP LEFT SHOULDER EXAM \par AUGUST 11, 2020 - LEFT 1.25CM RC REPAIR, REX\par \par PORTALS HEALING WELL - NO ERYTHEMA OR CALOR\par TENDER CREPITUS TRAPEZIUS TRIGGER \par \par AROM - 130 / 120\par \par DISTAL CMS INTACT\par

## 2021-01-07 NOTE — PROCEDURE
[de-identified] : TRIGGER POINT INJECTIONS\par \par Patient has demonstrated limited relief from NSAIDS, rest, exercises / PT, and after discussion of the risks and benefits, the patient elected to proceed with a trigger point injection into the \par LEFT AND RIGHT LEVATOR AND RHOMBOID X 2 \par \par  I confirmed no prior adverse reactions, no active infections, and no relevant allergies. \par The skin was prepped in the usual sterile manner. The site was injected with local anesthetic followed by local needling. \par The injection was completed without complication and a bandage was applied.\par  \par The patient tolerated the procedure well and was given post-injection instructions. Cold Tx x 48 hours, analgesics. prn \par Medications: 1.5cc of 1% xylocaine + 1.5cc.25% Bupivicaine + KENALOG 1MG per site

## 2021-01-07 NOTE — HISTORY OF PRESENT ILLNESS
[de-identified] : BILATERAL SHOULDER PAIN\par FOLLOW UP\par FLARE UP 5 DAYS AGO\par LEFT PAIN LEVEL 10/10\par RIGHT PAIN LEVEL 10/10\par LEFT AUGUST 11, 2020 - LEFT 1.25CM RC REPAIR, REX\par TRIGGER POINT INJECTIONS 10/14/20- HELPFUL FOR 1.5 WEEKS \par TRIGGER POINT INJECTIONS 11/12/2020- HELPFUL FOR 3 WEEKS OF RELIEF\par TRIGGER POINT INJECTIONS 12/17/20- HELPFUL FOR 3 WEEKS OF RELIEF

## 2021-01-20 ENCOUNTER — APPOINTMENT (OUTPATIENT)
Dept: ORTHOPEDIC SURGERY | Facility: CLINIC | Age: 76
End: 2021-01-20
Payer: COMMERCIAL

## 2021-01-20 VITALS — RESPIRATION RATE: 16 BRPM | HEIGHT: 63 IN | BODY MASS INDEX: 28.35 KG/M2 | WEIGHT: 160 LBS

## 2021-01-20 VITALS — TEMPERATURE: 96.5 F

## 2021-01-20 DIAGNOSIS — S69.91XA UNSPECIFIED INJURY OF RIGHT WRIST, HAND AND FINGER(S), INITIAL ENCOUNTER: ICD-10-CM

## 2021-01-20 PROCEDURE — 99072 ADDL SUPL MATRL&STAF TM PHE: CPT

## 2021-01-20 PROCEDURE — 99215 OFFICE O/P EST HI 40 MIN: CPT | Mod: 25

## 2021-01-20 PROCEDURE — 73140 X-RAY EXAM OF FINGER(S): CPT | Mod: F9

## 2021-01-20 PROCEDURE — 73110 X-RAY EXAM OF WRIST: CPT | Mod: RT

## 2021-01-20 PROCEDURE — 20550 NJX 1 TENDON SHEATH/LIGAMENT: CPT | Mod: F9

## 2021-01-28 ENCOUNTER — APPOINTMENT (OUTPATIENT)
Dept: ORTHOPEDIC SURGERY | Facility: CLINIC | Age: 76
End: 2021-01-28
Payer: COMMERCIAL

## 2021-01-28 PROCEDURE — 20553 NJX 1/MLT TRIGGER POINTS 3/>: CPT

## 2021-01-28 PROCEDURE — 99213 OFFICE O/P EST LOW 20 MIN: CPT | Mod: 25

## 2021-01-28 PROCEDURE — 99072 ADDL SUPL MATRL&STAF TM PHE: CPT

## 2021-01-28 NOTE — HISTORY OF PRESENT ILLNESS
[de-identified] : BILATERAL SHOULDER PAIN\par FOLLOW UP\par FLARE UP 8 DAYS AGO\par LEFT PAIN LEVEL 10/10\par RIGHT PAIN LEVEL 10/10\par LEFT AUGUST 11, 2020 - LEFT 1.25CM RC REPAIR, REX\par TRIGGER POINT INJECTIONS 10/14/20- HELPFUL FOR 1.5 WEEKS \par TRIGGER POINT INJECTIONS 11/12/2020- HELPFUL FOR 3 WEEKS OF RELIEF\par TRIGGER POINT INJECTIONS 12/17/20- HELPFUL FOR 3 WEEKS OF RELIEF \par TRIGGER POINT INJECTION 01/07/2021 HELPFUL FOR 2 WEEKS

## 2021-01-28 NOTE — PHYSICAL EXAM
[de-identified] : POST OP LEFT SHOULDER EXAM \par AUGUST 11, 2020 - LEFT 1.25CM RC REPAIR, REX\par \par PORTALS HEALING WELL - NO ERYTHEMA OR CALOR\par TENDER CREPITUS TRAPEZIUS TRIGGER \par \par AROM - 130 / 120\par \par DISTAL CMS INTACT\par 
n/a

## 2021-02-01 ENCOUNTER — RX RENEWAL (OUTPATIENT)
Age: 76
End: 2021-02-01

## 2021-02-01 DIAGNOSIS — K59.00 CONSTIPATION, UNSPECIFIED: ICD-10-CM

## 2021-02-18 ENCOUNTER — APPOINTMENT (OUTPATIENT)
Dept: ORTHOPEDIC SURGERY | Facility: CLINIC | Age: 76
End: 2021-02-18

## 2021-02-18 ENCOUNTER — APPOINTMENT (OUTPATIENT)
Dept: ORTHOPEDIC SURGERY | Facility: CLINIC | Age: 76
End: 2021-02-18
Payer: COMMERCIAL

## 2021-02-18 PROCEDURE — 99072 ADDL SUPL MATRL&STAF TM PHE: CPT

## 2021-02-18 PROCEDURE — 99213 OFFICE O/P EST LOW 20 MIN: CPT | Mod: 25

## 2021-02-18 PROCEDURE — 20611 DRAIN/INJ JOINT/BURSA W/US: CPT | Mod: 50

## 2021-02-18 RX ORDER — HYALURONATE SOD, CROSS-LINKED 30 MG/3 ML
30 SYRINGE (ML) INTRAARTICULAR
Qty: 2 | Refills: 0 | Status: ACTIVE | OUTPATIENT
Start: 2021-02-18

## 2021-03-03 ENCOUNTER — RX RENEWAL (OUTPATIENT)
Age: 76
End: 2021-03-03

## 2021-03-04 ENCOUNTER — APPOINTMENT (OUTPATIENT)
Dept: ORTHOPEDIC SURGERY | Facility: CLINIC | Age: 76
End: 2021-03-04

## 2021-03-11 ENCOUNTER — APPOINTMENT (OUTPATIENT)
Dept: ORTHOPEDIC SURGERY | Facility: CLINIC | Age: 76
End: 2021-03-11
Payer: COMMERCIAL

## 2021-03-11 PROCEDURE — 20553 NJX 1/MLT TRIGGER POINTS 3/>: CPT

## 2021-03-11 PROCEDURE — 99213 OFFICE O/P EST LOW 20 MIN: CPT | Mod: 25

## 2021-03-11 PROCEDURE — 99072 ADDL SUPL MATRL&STAF TM PHE: CPT

## 2021-03-11 NOTE — DISCUSSION/SUMMARY
[de-identified] : POST INJECTION INSTRUCTIONS\par \par COLD THERAPY , ANALGESICS PRN\par \par HOME STRETCHING AND EXERCISES QD\par \par \par

## 2021-03-11 NOTE — HISTORY OF PRESENT ILLNESS
[de-identified] : BILATERAL SHOULDER \par FOLLOW UP \par RIGHT WORSE THAN LEFT \par PAIN LEVEL RIGHT : 10/10\par PAIN LEVEL LEFT :8-9/10\par INTERMITTENT \par LEFT AUGUST 11, 2020-LEFT 1.25CM RC REPAIR, REX \par PREVIOUS TRIGGER POINT INJECTIONS 01/28/2021-HELPFUL \par PT IS REQUESTING TRIGGER POINT INJECTION \par \par

## 2021-03-17 ENCOUNTER — APPOINTMENT (OUTPATIENT)
Dept: ORTHOPEDIC SURGERY | Facility: CLINIC | Age: 76
End: 2021-03-17

## 2021-04-01 ENCOUNTER — RX RENEWAL (OUTPATIENT)
Age: 76
End: 2021-04-01

## 2021-05-12 ENCOUNTER — APPOINTMENT (OUTPATIENT)
Dept: ORTHOPEDIC SURGERY | Facility: CLINIC | Age: 76
End: 2021-05-12
Payer: COMMERCIAL

## 2021-05-12 DIAGNOSIS — S89.91XA UNSPECIFIED INJURY OF RIGHT LOWER LEG, INITIAL ENCOUNTER: ICD-10-CM

## 2021-05-12 PROCEDURE — 99213 OFFICE O/P EST LOW 20 MIN: CPT | Mod: 25

## 2021-05-12 PROCEDURE — 20611 DRAIN/INJ JOINT/BURSA W/US: CPT | Mod: 50

## 2021-05-12 PROCEDURE — 99072 ADDL SUPL MATRL&STAF TM PHE: CPT

## 2021-05-12 NOTE — HISTORY OF PRESENT ILLNESS
[de-identified] : RIGHT KNEE\par FOLLOW UP \par PAIN LEVEL 10/10\par STIFFNESS \par WEAKNESS\par  SWELLING \par GEL ONE -12/17/20- HELPFUL UP UNTIL SHE FELL\par

## 2021-05-12 NOTE — DISCUSSION/SUMMARY
[de-identified] : POST INJECTION INSTRUCTIONS\par \par COLD THERAPY , ANALGESICS PRN\par \par HOME STRETCHING AND EXERCISES QD\par \par \par MRI IF NO RELIEF\par

## 2021-05-12 NOTE — DISCUSSION/SUMMARY
[de-identified] : RIGHT KNEE MUCH WORSE \par PATIENT HAS ELECTED TO PROCEED WITH GELONE INJECTIONS BILATERAL KNEES  \par RISKS AND BENEFITS DISCUSSED - VERBAL CONSENT OBTAINED \par \par \par POST INJECTION INSTRUCTIONS\par \par COLD THERAPY , ANALGESICS PRN\par \par HOME STRETCHING AND EXERCISES QD  HANDOUT PROVIDED, REVIEWED AND DEMONSTRATED \par \par MRI RIGHT KNEE - EVALUATE WORSENINIG PAIN

## 2021-05-12 NOTE — PROCEDURE
[de-identified] : Patient has demonstrated limited relief from NSAIDS, rest, exercises / PT , and after discussion of the risks and benefits, the patient has elected to proceed with a\par n ULTRASOUND GUIDED VISCOSUPPLEMENT injection into the BILATERAL SupeLat PF Knee\par  \par Confirmed that the patient does not have history of prior adverse reactions, active, infections, or relevant allergies. There was no effusion, erythema, or warmth, and the skin was clear\par \par The skin was sterilized with alcohol. Ethyl Chloride was used as a topical anesthetic. Routine sterile technique. \par  \par The KNEE JOINT  was injected utilizing ULTRASOUND GUIDANCE with GELONE 3CC. The injection was completed without complication and a bandage was applied.\par \par The patient tolerated the procedure well and was given post-injection instructions.Rec: Cold therapy, analgesics, avoid heavy activity.\par \par MEDICATION: GELONE 3CC\par \par EFFUSION ASPIRATED  : RIGHT - 3CC\par \par \par \par TRIGGER POINT INJECTIONS\par \par Patient has demonstrated limited relief from NSAIDS, rest, exercises / PT, and after discussion of the risks and benefits, the patient elected to proceed with a trigger point injection into the \par LEFT TRAPEZIUS AT SCAP SPINE \par \par  I confirmed no prior adverse reactions, no active infections, and no relevant allergies. \par The skin was prepped in the usual sterile manner. The site was injected with local anesthetic followed by local needling. \par The injection was completed without complication and a bandage was applied.\par  \par The patient tolerated the procedure well and was given post-injection instructions. Cold Tx x 48 hours, analgesics. prn \par Medications: 1.5cc of 1% xylocaine + 1.5cc.25% Bupivicaine + KENALOG 1MG per site. \par

## 2021-05-12 NOTE — DISCUSSION/SUMMARY
[de-identified] : POST INJECTION INSTRUCTIONS\par \par COLD THERAPY , ANALGESICS PRN\par \par HOME STRETCHING AND EXERCISES QD\par \par CONTINUE PT

## 2021-05-12 NOTE — PROCEDURE
[de-identified] : Patient has demonstrated limited relief from NSAIDS, rest, exercises / PT , and after discussion of the risks and benefits, the patient has elected to proceed with a\par n ULTRASOUND GUIDED VISCOSUPPLEMENT injection into the BILATERAL SupeLat PF Knee\par  \par Confirmed that the patient does not have history of prior adverse reactions, active, infections, or relevant allergies. There was no effusion, erythema, or warmth, and the skin was clear\par \par The skin was sterilized with alcohol. Ethyl Chloride was used as a topical anesthetic. Routine sterile technique. \par  \par The KNEE JOINT  was injected utilizing ULTRASOUND GUIDANCE with GELONE 3ML. The injection was completed without complication and a bandage was applied.\par \par The patient tolerated the procedure well and was given post-injection instructions.Rec: Cold therapy, analgesics, avoid heavy activity.\par \par MEDICATION: GELONE 3ML\par \par EFFUSION ASPIRATED  : NONE\par

## 2021-05-12 NOTE — HISTORY OF PRESENT ILLNESS
[de-identified] : CHRONIC BILATERAL KNEE PAIN \par RIGHT WORSE THAN LEFT \par FOLLOW UP \par PAIN LEVEL: 7-8/10\par NERVE PAIN ON RIGHT \par PREVIOUS CORTISONE  INJECTION-2/18/2021- HELPFUL \par PREVIOUS GEL ONE INJECTION 12/17/2020- HELPFUL \par GEL INJECTION TODAY

## 2021-05-12 NOTE — PROCEDURE
[de-identified] : INJECTION / ASPIRATION BILATERAL KNEES\par \par Patient has demonstrated limited relief from NSAIDS, rest, exercises / PT , and after discussion of the risks and benefits, the patient has elected to proceed with a\par n ULTRASOUND GUIDED corticosteroid injection into the BILATERAL SupeLat PF Knee\par  \par Confirmed that the patient does not have history of prior adverse reactions, active, infections, or relevant allergies. There was no effusion, erythema, or warmth, and the skin was clear\par \par The skin was sterilized with alcohol. Ethyl Chloride was used as a topical anesthetic. Routine sterile technique. \par  \par The KNEE JOINT  was injected utilizing ULTRASOUND GUIDANCEwith KENALOG + LIDOCAINE. The injection was completed without complication and a bandage was applied.\par \par The patient tolerated the procedure well and was given post-injection instructions.Rec: Cold therapy, analgesics, avoid heavy activity.\par \par MEDICATION: Lidocaine 1% 4cc + 10mg of Kenalog\par

## 2021-05-12 NOTE — PHYSICAL EXAM
[de-identified] : PHYSICAL EXAM BILATERAL KNEES\par \par PAIN AND TENDERNESS LATERAL JOINT RADIATES DOWN LEG \par \par AROM\par RIGHT 10- 115\par LEFT 0- 130\par \par SPECIAL TESTS\par \par PATELLAR GRIND = GRIND \par DRAWER = NEG\par LACHMAN = NEG\par MACMURRAY = POS RIGHT KNEE \par \par MOTOR = GROSSLY INTACT\par SENSORY = GROSSLY INTACT \par \par

## 2021-05-12 NOTE — PHYSICAL EXAM
[de-identified] : PHYSICAL EXAM BILATERAL KNEES\par \par PAIN AND TENDERNESS LATERAL JOINT RADIATES DOWN LEG \par \par AROM\par RIGHT 10- 115\par LEFT 0- 130\par \par SPECIAL TESTS\par \par PATELLAR GRIND = GRIND \par DRAWER = NEG\par LACHMAN = NEG\par MACMURRAY = POS RIGHT KNEE \par \par MOTOR = GROSSLY INTACT\par SENSORY = GROSSLY INTACT \par \par

## 2021-05-12 NOTE — HISTORY OF PRESENT ILLNESS
[de-identified] : BILATERAL KNEE PAIN\par FOLLOW UP\par RIGHT WORSE THEN LEFT\par PAIN LEVEL 9/10\par PREVIOUS CORTISONE INJECTIONS HELPFUL\par PREVIOUS GEL INJECTIONS VERY HELPFUL\par \par BILATERAL SHOULDER PAIN\par FOLLOW UP\par RIGHT WORSE THEN LEFT \par 10/10 PAIN LEVEL \par LEFT AUGUST 11, 2020 - LEFT 1.25CM RC REPAIR, REX\par TRIGGER POINT INJECTIONS 10/14/20- HELPFUL FOR 1.5 WEEKS \par TRIGGER POINT INJECTIONS 11/12/2020- HELPFUL FOR 3 WEEKS OF RELIEF \par TRIGGER POINT INJECTINOS 12/3/2020- HELPFUL FOR 2 WEEKS \par

## 2021-05-12 NOTE — PHYSICAL EXAM
[de-identified] : PHYSICAL EXAM BILATERAL KNEES\par \par PAIN AND TENDERNESS LATERAL JOINT RADIATES DOWN LEG \par \par AROM\par RIGHT  10- 115\par LEFT    0- 130\par \par SPECIAL TESTS\par \par PATELLAR GRIND = GRIND \par DRAWER  = NEG\par LACHMAN = NEG\par MACMURRAY = POS RIGHT KNEE \par \par MOTOR = GROSSLY INTACT\par SENSORY = GROSSLY INTACT \par \par \par

## 2021-05-13 ENCOUNTER — APPOINTMENT (OUTPATIENT)
Dept: ORTHOPEDIC SURGERY | Facility: CLINIC | Age: 76
End: 2021-05-13
Payer: COMMERCIAL

## 2021-05-13 PROCEDURE — 99213 OFFICE O/P EST LOW 20 MIN: CPT | Mod: 25

## 2021-05-13 PROCEDURE — 99072 ADDL SUPL MATRL&STAF TM PHE: CPT

## 2021-05-13 PROCEDURE — 20553 NJX 1/MLT TRIGGER POINTS 3/>: CPT

## 2021-05-13 NOTE — PROCEDURE
[de-identified] : TRIGGER POINT INJECTIONS\par \par Patient has demonstrated limited relief from NSAIDS, rest, exercises / PT, and after discussion of the risks and benefits, the patient elected to proceed with a trigger point injection into the \par LEFT LEVATOR and RHOMBOID\par RIGHT LEVATOR and RHOMBOID\par \par  I confirmed no prior adverse reactions, no active infections, and no relevant allergies. \par The skin was prepped in the usual sterile manner. The site was injected with local anesthetic followed by local needling. \par The injection was completed without complication and a bandage was applied.\par  \par The patient tolerated the procedure well and was given post-injection instructions. Cold Tx x 48 hours, analgesics. prn \par Medications: 1.5cc of 1% xylocaine + 1.5cc.25% Bupivicaine + KENALOG 1MG per site

## 2021-05-13 NOTE — HISTORY OF PRESENT ILLNESS
[de-identified] : CHRONIC BILATERAL SHOULDER PAIN \par RIGHT WORSE THAN LEFT \par PAIN LEVEL: 10/10\par WORSE AT NIGHT \par PREVIOUS TRIGGER POINT MARCH 11, 2021- HELPFUL \par PT IS REQUESTING TRIGGER POINT INJECTION TODAY

## 2021-05-27 ENCOUNTER — APPOINTMENT (OUTPATIENT)
Dept: ORTHOPEDIC SURGERY | Facility: CLINIC | Age: 76
End: 2021-05-27
Payer: COMMERCIAL

## 2021-05-27 DIAGNOSIS — M17.11 UNILATERAL PRIMARY OSTEOARTHRITIS, RIGHT KNEE: ICD-10-CM

## 2021-05-27 PROCEDURE — 20553 NJX 1/MLT TRIGGER POINTS 3/>: CPT

## 2021-05-27 PROCEDURE — 99213 OFFICE O/P EST LOW 20 MIN: CPT | Mod: 25

## 2021-05-27 NOTE — HISTORY OF PRESENT ILLNESS
[de-identified] : RIGHT KNEE CHRONIC PAIN \par MAY 7, 2012 - RIGHT PARTIAL LATERAL MENISC , MICROFRACTURE OCG PATELLA 5MM\par FOLLOW UP \par INTERMITTENT \par PAIN LEVEL: 6-7/10\par MRI RESULTS TODAY

## 2021-05-27 NOTE — PROCEDURE
[de-identified] : TRIGGER POINT INJECTIONS\par \par Patient has demonstrated limited relief from NSAIDS, rest, exercises / PT, and after discussion of the risks and benefits, the patient elected to proceed with a trigger point injection into the \par LEFT LEVATOR and RHOMBOID\par RIGHT LEVATOR and RHOMBOID\par \par  I confirmed no prior adverse reactions, no active infections, and no relevant allergies. \par The skin was prepped in the usual sterile manner. The site was injected with local anesthetic followed by local needling. \par The injection was completed without complication and a bandage was applied.\par  \par The patient tolerated the procedure well and was given post-injection instructions. Cold Tx x 48 hours, analgesics. prn \par Medications: 1.5cc of 1% xylocaine + 1.5cc.25% Bupivicaine + KENALOG 1MG per site. \par

## 2021-05-27 NOTE — DISCUSSION/SUMMARY
[de-identified] : HOME EXERCISES FOR TRIGGER POINTS \par CONSIDER RIGHT RC REPAIR \par CONSIDER KNEE ARTHROSCOPY FOR MENISCUS TEAR

## 2021-06-03 ENCOUNTER — APPOINTMENT (OUTPATIENT)
Dept: ORTHOPEDIC SURGERY | Facility: CLINIC | Age: 76
End: 2021-06-03
Payer: COMMERCIAL

## 2021-06-03 DIAGNOSIS — S83.289A OTHER TEAR OF LATERAL MENISCUS, CURRENT INJURY, UNSPECIFIED KNEE, INITIAL ENCOUNTER: ICD-10-CM

## 2021-06-03 PROCEDURE — 20611 DRAIN/INJ JOINT/BURSA W/US: CPT | Mod: RT

## 2021-06-03 PROCEDURE — 99213 OFFICE O/P EST LOW 20 MIN: CPT | Mod: 25

## 2021-06-03 NOTE — PHYSICAL EXAM
[de-identified] : PHYSICAL EXAM BILATERAL KNEES\par \par PAIN AND TENDERNESS LATERAL JOINT RADIATES DOWN LEG \par \par AROM\par RIGHT 10- 115\par LEFT 0- 130\par \par SPECIAL TESTS\par \par PATELLAR GRIND = GRIND \par DRAWER = NEG\par LACHMAN = NEG\par MACMURRAY = POS RIGHT KNEE \par \par MOTOR = GROSSLY INTACT\par SENSORY = GROSSLY INTACT \par \par

## 2021-06-03 NOTE — HISTORY OF PRESENT ILLNESS
[de-identified] : RIGHT KNEE CHRONIC PAIN \par MAY 7, 2012 - RIGHT PARTIAL LATERAL MENISC , MICROFRACTURE OCG PATELLA 5MM\par FOLLOW UP \par PAIN LEVEL : 7-8/10\par WORSE AT NIGHT \par PT IS HERE TO DRAIN FLUID

## 2021-06-03 NOTE — PROCEDURE
[de-identified] : INJECTION / ASPIRATION  RIGHT KNEE\par \par Patient has demonstrated limited relief from NSAIDS, rest, exercises / PT , and after discussion of the risks and benefits, the patient has elected to proceed with a\par n ULTRASOUND GUIDED corticosteroid injection into the RIGHT SupeLat PF Knee\par  \par Confirmed that the patient does not have history of prior adverse reactions, active, infections, or relevant allergies. There was no effusion, erythema, or warmth, and the skin was clear\par \par The skin was sterilized with alcohol. Ethyl Chloride was used as a topical anesthetic. Routine sterile technique. \par  \par The KNEE JOINT  was injected utilizing ULTRASOUND GUIDANCEwith KENALOG + LIDOCAINE. The injection was completed without complication and a bandage was applied.\par \par The patient tolerated the procedure well and was given post-injection instructions.Rec: Cold therapy, analgesics, avoid heavy activity.\par \par MEDICATION: Lidocaine 1% 4cc + 40mg of Kenalog\par \par EFFUSION ASPIRATED: RIGHT 5CC CLEAR \par

## 2021-06-28 ENCOUNTER — APPOINTMENT (OUTPATIENT)
Dept: ORTHOPEDIC SURGERY | Facility: CLINIC | Age: 76
End: 2021-06-28
Payer: COMMERCIAL

## 2021-06-28 PROCEDURE — 99213 OFFICE O/P EST LOW 20 MIN: CPT | Mod: 25

## 2021-06-28 PROCEDURE — 20553 NJX 1/MLT TRIGGER POINTS 3/>: CPT

## 2021-06-30 NOTE — PROCEDURE
[de-identified] : TRIGGER POINT INJECTIONS\par \par Patient has demonstrated limited relief from NSAIDS, rest, exercises / PT, and after discussion of the risks and benefits, the patient elected to proceed with a trigger point injection into the \par LEFT LEVATOR and RHOMBOID\par RIGHT LEVATOR and RHOMBOID\par \par  I confirmed no prior adverse reactions, no active infections, and no relevant allergies. \par The skin was prepped in the usual sterile manner. The site was injected with local anesthetic followed by local needling. \par The injection was completed without complication and a bandage was applied.\par  \par The patient tolerated the procedure well and was given post-injection instructions. Cold Tx x 48 hours, analgesics. prn \par Medications: 1.5cc of 1% xylocaine + 1.5cc.25% Bupivicaine + KENALOG 1MG per site.

## 2021-06-30 NOTE — DISCUSSION/SUMMARY
[de-identified] : HOME EXERCISES FOR TRIGGER POINTS \par CONSIDER RIGHT RC REPAIR \par CONSIDER KNEE ARTHROSCOPY FOR MENISCUS TEAR.

## 2021-06-30 NOTE — HISTORY OF PRESENT ILLNESS
[de-identified] : CHRONIC BILATERAL SHOULDER PAIN \par FOLLOW UP \par RIGHT WORSE THAN LEFT\par PAIN LEVEL: 8/10\par PREVIOUS TRIGGER POINT INJECTION - 5/13/21- HELPFUL \par PT IS HERE TO DISCUSS SURGERY- RIGHT SHOULDER

## 2021-06-30 NOTE — PHYSICAL EXAM
[de-identified] : PHYSICAL EXAM LEFT AND RIGHT SHOULDER\par \par MILD SCAPULAR PROTRACTION\par AROM \par LEFT 140 / 140 / 90 / 30\par RIGHT 140 / 140 / 90 / 30\par TENDER: SA REGION AND PERISCAPULAR TRIGGER POINTS \par \par \par RC STRENGTH TESTING \par SS: 5/5\par SUB 5/5\par IS 5/5\par BICEPS 5/5\par \par SENSATION - GROSSLY INTACT

## 2021-08-02 ENCOUNTER — APPOINTMENT (OUTPATIENT)
Dept: ORTHOPEDIC SURGERY | Facility: CLINIC | Age: 76
End: 2021-08-02
Payer: COMMERCIAL

## 2021-08-02 PROCEDURE — 99213 OFFICE O/P EST LOW 20 MIN: CPT

## 2021-08-02 RX ORDER — ONDANSETRON 8 MG/1
8 TABLET, ORALLY DISINTEGRATING ORAL 3 TIMES DAILY
Qty: 15 | Refills: 0 | Status: ACTIVE | COMMUNITY
Start: 2021-08-02 | End: 1900-01-01

## 2021-08-02 NOTE — HISTORY OF PRESENT ILLNESS
[de-identified] : RIGHT SHOULDER CHRONIC PAIN \par FOLLOW UP\par PAIN LEVEL: 10/10 \par AUGUST 3, 2021- RIGHT 17 MM ROT CUFF REPAIR, REX, DEBRIDEMENT \par ICE AND MEDICATION AS NEEDED \par

## 2021-08-02 NOTE — DISCUSSION/SUMMARY
[de-identified] : PREOP SHOULDER SURGERY DISCUSSION:\par \par PROCEDURE DISCUSSED - QUESTIONS ANSWERED\par PATIENT WISHES TO PROCEED\par \par POST OP CARE AND LIMITATIONS REVIEWED - HANDOUT PROVIDED \par \par COLD PACKS RECOMMENDED\par ANALGESICS PRESCRIBED \par \par \par THERE ARE NO GUARANTEES THAT ALL SYMPTOMS WILL BE ALLEVIATED  \par SHOULDER ARTHROSCOPY, ACROMIOPLASTY, DEBRIDEMENT,  RC REPAIR AND LABRUM REPAIRS- ON AVERAGE 75- 85% SATISFACTORY RESULTS FOR TEARS < 3CM AFTER 9-12 MONTHS HEALING AND REHABILITATION. \par \par REPAIRS WILL REQUIRE STRICT SHOULDER IMMOBILIZER 4-6 WEEKS\par \par RC TEARS 3CM OR LARGER MAY REQUIRE COLLAGEN PATCH AUGMENTATION GENERALLY HAVE LESS SATISFACTORY RESULTS\par \par PHYSICAL THERAPY REQUIRED 2X WEEK FOR  MINIMUM 8-12 WEEKS FOR ALL PROCEDURES \par CONTINUED HOME EXERCISES 6-9 MONTHS AFTER THAT REQUIRED FOR OPTIMAL OUTCOMES \par \par ROUTINE SURGICAL AND ANESTHETIC RISKS INCLUDE RISK OF SURGICAL INFECTION, ANESTHETIC COMPLICATION OR ALLERGY, POSSIBLE RETEARS OR PROGRESSION OF TEAR, STIFFNESS OF SHOULDER AND UNSATISFACTORY OUTCOMES\par \par PATIENT UNDERSTANDS AND WISHES TO PROCEED\par

## 2021-08-02 NOTE — PHYSICAL EXAM
[de-identified] : PHYSICAL EXAM LEFT AND RIGHT SHOULDER\par \par MILD SCAPULAR PROTRACTION\par AROM \par LEFT 140 / 140 / 90 / 30\par RIGHT 130 / 120 / 70 / 10\par TENDER: SA REGION AND PERISCAPULAR TRIGGER POINTS \par \par \par RC STRENGTH TESTING \par SS: 5/5\par SUB 5/5\par IS 5/5\par BICEPS 5/5\par \par SENSATION - GROSSLY INTACT

## 2021-08-03 ENCOUNTER — APPOINTMENT (OUTPATIENT)
Dept: ORTHOPEDIC SURGERY | Facility: AMBULATORY SURGERY CENTER | Age: 76
End: 2021-08-03
Payer: COMMERCIAL

## 2021-08-03 PROCEDURE — 29826 SHO ARTHRS SRG DECOMPRESSION: CPT | Mod: RT,59

## 2021-08-03 PROCEDURE — 29827 SHO ARTHRS SRG RT8TR CUF RPR: CPT | Mod: RT

## 2021-08-03 PROCEDURE — 29820 SHO ARTHRS SRG PRTL SYNVCT: CPT | Mod: RT,59

## 2021-08-03 PROCEDURE — 29825 SHO ARTHRS SRG LSS&RESCJ ADS: CPT | Mod: RT,59

## 2021-08-03 PROCEDURE — 29823 SHO ARTHRS SRG XTNSV DBRDMT: CPT | Mod: RT,59

## 2021-08-06 ENCOUNTER — APPOINTMENT (OUTPATIENT)
Dept: ORTHOPEDIC SURGERY | Facility: CLINIC | Age: 76
End: 2021-08-06
Payer: COMMERCIAL

## 2021-08-06 PROCEDURE — 99024 POSTOP FOLLOW-UP VISIT: CPT

## 2021-08-06 PROCEDURE — 73030 X-RAY EXAM OF SHOULDER: CPT | Mod: RT

## 2021-08-06 NOTE — PHYSICAL EXAM
[de-identified] : RIGHT SHOULDER XRAY (2 VIEWS - AP AND OUTLET)  -  \par NO OBVIOUS FRACTURE OR DISLOCATION, \par SATISFACTORY DECOMPRESSION NOTED  , \par ANCHOR SILHOUETTE VISIBLE IN GREATER TUBEROSITY- SATISFACTORY POSITION\par  [de-identified] : POST OP SHOULDER EXAM \par \par PORTALS HEALING WELL - NO ERYTHEMA OR CALOR\par SUTURES REMOVED, STERISTRIPS AND WATERPROOF BANDAIDS  APPLIED \par \par AROM  NT\par \par DISTAL CMS INTACT\par

## 2021-08-06 NOTE — HISTORY OF PRESENT ILLNESS
[de-identified] : RIGHT SHOULDER CHRONIC PAIN \par FOLLOW UP\par PAIN LEVEL: 5/10\par AUGUST 3, 2021- RIGHT 15MM ROT CUFF REPAIR, REX\par \par

## 2021-08-06 NOTE — HISTORY OF PRESENT ILLNESS
[de-identified] : RIGHT SHOULDER CHRONIC PAIN \par FOLLOW UP\par PAIN LEVEL: 5/10\par AUGUST 3, 2021- RIGHT 15MM ROT CUFF REPAIR, REX\par \par

## 2021-08-06 NOTE — PHYSICAL EXAM
[de-identified] : RIGHT SHOULDER XRAY (2 VIEWS - AP AND OUTLET)  -  \par NO OBVIOUS FRACTURE OR DISLOCATION, \par SATISFACTORY DECOMPRESSION NOTED  , \par ANCHOR SILHOUETTE VISIBLE IN GREATER TUBEROSITY- SATISFACTORY POSITION\par  [de-identified] : POST OP SHOULDER EXAM \par \par PORTALS HEALING WELL - NO ERYTHEMA OR CALOR\par SUTURES REMOVED, STERISTRIPS AND WATERPROOF BANDAIDS  APPLIED \par \par AROM  NT\par \par DISTAL CMS INTACT\par

## 2021-08-27 ENCOUNTER — APPOINTMENT (OUTPATIENT)
Dept: ORTHOPEDIC SURGERY | Facility: CLINIC | Age: 76
End: 2021-08-27
Payer: COMMERCIAL

## 2021-08-27 PROCEDURE — 99024 POSTOP FOLLOW-UP VISIT: CPT

## 2021-08-27 RX ORDER — HYDROMORPHONE HYDROCHLORIDE 4 MG/1
4 TABLET ORAL
Qty: 42 | Refills: 0 | Status: ACTIVE | COMMUNITY
Start: 2021-08-09 | End: 1900-01-01

## 2021-08-27 RX ORDER — DOCUSATE SODIUM 50MG AND SENNOSIDES 8.6MG 8.6; 5 MG/1; MG/1
8.6-5 TABLET, FILM COATED ORAL
Qty: 60 | Refills: 0 | Status: ACTIVE | COMMUNITY
Start: 2021-02-01 | End: 1900-01-01

## 2021-08-27 NOTE — PHYSICAL EXAM
[de-identified] : POST OP SHOULDER EXAM \par AUGUST 3, 2021- RIGHT 15MM ROT CUFF REPAIR, REX\par \par \par PORTALS HEALING WELL - NO ERYTHEMA OR CALOR\par PENDULUM \par AROM  PENDULUM \par \par DISTAL CMS INTACT\par

## 2021-08-27 NOTE — HISTORY OF PRESENT ILLNESS
[de-identified] : RIGHT SHOULDER CHRONIC PAIN \par FOLLOW UP\par PAIN LEVEL: 9/10 \par AUGUST 3, 2021- RIGHT 15MM ROT CUFF REPAIR, REX\par \par

## 2021-08-27 NOTE — DISCUSSION/SUMMARY
[de-identified] : POST OP REPAIR: AUGUST 3, 2021- RIGHT 15MM ROT CUFF REPAIR, REX\par \par \par COLD THERAPY,ANALGESICS AS NEEDED\par \par SHOULDER IMMOBILIZED FULL TIME X 3 - 6 WEEKS - STRICT NO AROM - DESKTOP WORK ALLOWED\par \par SCAPULAR SQUEEZES / ROLLS, ELBOW, WRIST, HAND AROM TID \par \par START PENDULUM EXERCISES, EXT ROT  3 WEEK POSTOP\par \par START AAROM FLEXION, PULLEY  5 WEEKS POST OP\par \par 6 WEEKS POSTOP  - ADVANCE TO P.T.\par

## 2021-10-07 ENCOUNTER — APPOINTMENT (OUTPATIENT)
Dept: ORTHOPEDIC SURGERY | Facility: CLINIC | Age: 76
End: 2021-10-07
Payer: COMMERCIAL

## 2021-10-07 PROCEDURE — 99024 POSTOP FOLLOW-UP VISIT: CPT

## 2021-10-07 RX ORDER — OXYCODONE AND ACETAMINOPHEN 7.5; 325 MG/1; MG/1
7.5-325 TABLET ORAL
Qty: 42 | Refills: 0 | Status: ACTIVE | COMMUNITY
Start: 2021-08-02 | End: 1900-01-01

## 2021-10-07 RX ORDER — HYALURONATE SOD, CROSS-LINKED 30 MG/3 ML
30 SYRINGE (ML) INTRAARTICULAR
Qty: 2 | Refills: 0 | Status: ACTIVE | OUTPATIENT
Start: 2021-10-07

## 2021-10-07 NOTE — PHYSICAL EXAM
[de-identified] : POST OP SHOULDER EXAM \par AUGUST 3, 2021- RIGHT 15MM ROT CUFF REPAIR, REX\par \par \par PORTALS HEALING WELL - NO ERYTHEMA OR CALOR\par PENDULUM \par AROM 110 / 90  \par \par \par DISTAL CMS INTACT\par

## 2021-10-07 NOTE — HISTORY OF PRESENT ILLNESS
[de-identified] : RIGHT SHOULDER CHRONIC PAIN \par FOLLOW UP\par PAIN LEVEL: 8/10 \par AUGUST 3, 2021- RIGHT 15MM ROT CUFF REPAIR, REX\par PT IS GOING TO P.T- 2 X WEEK- HEKPFUL\par AT HOME EXERCISES-HELPFUL \par

## 2021-10-07 NOTE — DISCUSSION/SUMMARY
[de-identified] : POST OP REPAIR: AUGUST 3, 2021- RIGHT 15MM ROT CUFF REPAIR, REX\par \par \par COLD THERAPY,ANALGESICS AS NEEDED\par PT 2 X 6 WEEKS  - PROGRESSIVE AROM - PUULEY AND HOME EXERCISES DAILY

## 2021-10-11 ENCOUNTER — APPOINTMENT (OUTPATIENT)
Dept: ORTHOPEDIC SURGERY | Facility: CLINIC | Age: 76
End: 2021-10-11
Payer: COMMERCIAL

## 2021-10-11 PROCEDURE — 20611 DRAIN/INJ JOINT/BURSA W/US: CPT | Mod: 50,79

## 2021-10-11 PROCEDURE — 99213 OFFICE O/P EST LOW 20 MIN: CPT | Mod: 25

## 2021-10-11 NOTE — HISTORY OF PRESENT ILLNESS
[de-identified] : CHRONIC BILATERAL KNEE PAIN\par RIGHT WORSE THAN LEFT \par MAY 7, 2012 - RIGHT PARTIAL LATERAL MENISC , MICROFRACTURE OCG PATELLA 5MM\par FOLLOW UP \par CONSTANT\par PAIN LEVEL : 10/10\par HAD  PRP IN JAN 2019 \par WORSE AT NIGHT, GOING DOWN STAIRS \par \par

## 2021-10-11 NOTE — DISCUSSION/SUMMARY
[de-identified] : \par PATIENT HAS ELECTED TO PROCEED WITH KENALOG INJECTION BOTH KNEES\par RISKS AND BENEFITS DISCUSSED - VERBAL CONSENT OBTAINED \par \par \par POST INJECTION INSTRUCTIONS\par \par COLD THERAPY , ANALGESICS PRN\par \par HOME STRETCHING AND EXERCISES QD  \par \par GEL ONE ORDERED \par

## 2021-10-11 NOTE — PROCEDURE
[de-identified] : INJECTION / ASPIRATION BILATERAL KNEES\par \par Patient has demonstrated limited relief from NSAIDS, rest, exercises / PT , and after discussion of the risks and benefits, the patient has elected to proceed with a\par n ULTRASOUND GUIDED corticosteroid injection into the BILATERAL FADI-Lat PF Knee\par  \par Confirmed that the patient does not have history of prior adverse reactions, active, infections, or relevant allergies. There was no effusion, erythema, or warmth, and the skin was clear\par \par The skin was sterilized with alcohol. Ethyl Chloride was used as a topical anesthetic. Routine sterile technique. \par  \par The KNEE JOINT  was injected utilizing ULTRASOUND GUIDANCEwith KENALOG + LIDOCAINE. The injection was completed without complication and a bandage was applied.\par \par The patient tolerated the procedure well and was given post-injection instructions.Rec: Cold therapy, analgesics, avoid heavy activity.\par \par MEDICATION: Lidocaine 1% 4cc + 10mg of Kenalog\par

## 2021-10-11 NOTE — PHYSICAL EXAM
[de-identified] : PHYSICAL EXAM BILATERAL KNEES\par \par PAIN AND TENDERNESS LATERAL JOINT RADIATES DOWN LEG \par \par AROM\par RIGHT 10- 115\par LEFT 0- 130\par \par SPECIAL TESTS\par \par PATELLAR GRIND = GRIND \par DRAWER = NEG\par LACHMAN = NEG\par MACMURRAY = POS RIGHT KNEE \par \par MOTOR = GROSSLY INTACT\par SENSORY = GROSSLY INTACT \par \par \par DISTAL CMS INTACT\par

## 2021-11-11 ENCOUNTER — APPOINTMENT (OUTPATIENT)
Dept: ORTHOPEDIC SURGERY | Facility: CLINIC | Age: 76
End: 2021-11-11
Payer: COMMERCIAL

## 2021-11-11 PROCEDURE — 20611 DRAIN/INJ JOINT/BURSA W/US: CPT | Mod: 50

## 2021-11-11 PROCEDURE — 99213 OFFICE O/P EST LOW 20 MIN: CPT | Mod: 25

## 2021-11-11 NOTE — HISTORY OF PRESENT ILLNESS
[de-identified] : CHRONIC BILATERAL KNEE PAIN\par RIGHT WORSE THAN LEFT \par MAY 7, 2012 - RIGHT PARTIAL LATERAL MENISC , MICROFRACTURE OCG PATELLA 5MM\par FOLLOW UP \par CONSTANT\par PAIN LEVEL : 10/10\par HAD  PRP IN JAN 2019 \par WORSE AT NIGHT, GOING DOWN STAIRS \par OCTOBER 11, 2021- PREVIOUS CORTISONE INJECTION -HELPFUL FOR 3 WEEK \par MONOVISC INJECTION TODAY- PROVIDED BY INSURANCE  \par

## 2021-11-11 NOTE — DISCUSSION/SUMMARY
[de-identified] : \par PATIENT HAS ELECTED TO PROCEED WITH KENALOG INJECTION BOTH KNEES\par RISKS AND BENEFITS DISCUSSED - VERBAL CONSENT OBTAINED \par \par \par POST INJECTION INSTRUCTIONS\par \par COLD THERAPY , ANALGESICS PRN\par \par HOME STRETCHING AND EXERCISES QD  \par

## 2021-11-11 NOTE — PROCEDURE
[de-identified] : Patient has demonstrated limited relief from NSAIDS, rest, exercises / PT , and after discussion of the risks and benefits, the patient has elected to proceed with a\par n ULTRASOUND GUIDED VISCOSUPPLEMENT injection into the BILATERAL SupeLat PF Knee\par  \par Confirmed that the patient does not have history of prior adverse reactions, active, infections, or relevant allergies. There was no effusion, erythema, or warmth, and the skin was clear\par \par The skin was sterilized with alcohol. Ethyl Chloride was used as a topical anesthetic. Routine sterile technique. \par  \par The KNEE JOINT  was injected utilizing ULTRASOUND GUIDANCE with MONOVISC 4CC. The injection was completed without complication and a bandage was applied.\par \par The patient tolerated the procedure well and was given post-injection instructions.Rec: Cold therapy, analgesics, avoid heavy activity.\par \par MEDICATION: MONOVISC 4CC\par \par EFFUSION ASPIRATED  :\par

## 2021-11-11 NOTE — PHYSICAL EXAM
[de-identified] : PHYSICAL EXAM BILATERAL KNEES\par \par PAIN AND TENDERNESS LATERAL JOINT RADIATES DOWN LEG \par \par AROM\par RIGHT 10- 115\par LEFT 0- 130\par \par SPECIAL TESTS\par \par PATELLAR GRIND = GRIND \par DRAWER = NEG\par LACHMAN = NEG\par MACMURRAY = POS RIGHT KNEE \par \par MOTOR = GROSSLY INTACT\par SENSORY = GROSSLY INTACT \par \par \par DISTAL CMS INTACT\par

## 2021-12-06 ENCOUNTER — APPOINTMENT (OUTPATIENT)
Dept: ORTHOPEDIC SURGERY | Facility: CLINIC | Age: 76
End: 2021-12-06
Payer: COMMERCIAL

## 2021-12-06 PROCEDURE — 99213 OFFICE O/P EST LOW 20 MIN: CPT

## 2021-12-08 NOTE — PHYSICAL EXAM
[de-identified] : POST OP RIGHT  SHOULDER EXAM \par AUGUST 3, 2021- RIGHT 15MM ROT CUFF REPAIR, REX\par \par \par PORTALS HEALING WELL\par \par AROM 120 / 120 \par RC = 4 / 5 \par DISTAL CMS INTACT\par \par

## 2021-12-08 NOTE — HISTORY OF PRESENT ILLNESS
[de-identified] : RIGHT SHOULDER CHRONIC PAIN \par FOLLOW UP 4 MONTHS\par PAIN LEVEL: 8-9/10 \par AUGUST 3, 2021- RIGHT 15 MM ROT CUFF REPAIR, REX\par PT IS GOING TO P.T- 2 X WEEK- HELPFUL\par AT HOME EXERCISES-HELPFUL \par

## 2021-12-20 ENCOUNTER — APPOINTMENT (OUTPATIENT)
Dept: ORTHOPEDIC SURGERY | Facility: CLINIC | Age: 76
End: 2021-12-20
Payer: COMMERCIAL

## 2021-12-20 PROCEDURE — 20611 DRAIN/INJ JOINT/BURSA W/US: CPT | Mod: 50

## 2021-12-20 PROCEDURE — 99213 OFFICE O/P EST LOW 20 MIN: CPT | Mod: 25

## 2021-12-20 NOTE — PHYSICAL EXAM
[de-identified] : POST OP RIGHT  SHOULDER EXAM \par AUGUST 3, 2021- RIGHT 15MM ROT CUFF REPAIR, REX\par \par \par PORTALS HEALING WELL\par \par AROM 120 / 120 \par RC = 4 / 5 \par DISTAL CMS INTACT\par \par

## 2021-12-20 NOTE — DISCUSSION/SUMMARY
[de-identified] : XRAYS REVEAL KNEE OSTEOARTHRITIS\par LITTLE RELIEF FROM NSAIDS , EXERCISES \par PATIENT HAS ELECTED TO PROCEED WITH PRP INJECTION KNEES (KIT PROVIDED BY PATIENT)  \par RISKS AND BENEFITS DISCUSSED - VERBAL CONSENT OBTAINED \par \par \par POST INJECTION INSTRUCTIONS\par \par COLD THERAPY , ANALGESICS PRN\par \par HOME STRETCHING AND EXERCISES QD  -  KNEE OA  HANDOUT PROVIDED, REVIEWED AND DEMONSTRATED \par \par \par

## 2021-12-20 NOTE — HISTORY OF PRESENT ILLNESS
[de-identified] : CHRONIC BILATERAL KNEE PAIN\par RIGHT WORSE THAN LEFT \par MAY 7, 2012 - RIGHT PARTIAL LATERAL MENISC , MICROFRACTURE OCG PATELLA 5MM\par FOLLOW UP \par CONSTANT\par PAIN LEVEL : 9/10\par HAD PRP IN JAN 2019 \par WORSE AT NIGHT, GOING DOWN STAIRS \par PRP TODAY- PT HAS HER OWN KITS \par OCTOBER 11, 2021- PREVIOUS CORTISONE INJECTION -HELPFUL FOR 3 WEEK \par \par

## 2021-12-20 NOTE — PROCEDURE
[de-identified] : INJECTION / ASPIRATION BILATERAL KNEES\par \par Patient has demonstrated limited relief from NSAIDS, rest, exercises / PT , and after discussion of the risks and benefits, the patient has elected to proceed with a\par n ULTRASOUND GUIDED corticosteroid injection into the BILATERAL SupeLat PF Knee\par  \par Confirmed that the patient does not have history of prior adverse reactions, active, infections, or relevant allergies. There was no effusion, erythema, or warmth, and the skin was clear\par \par The skin was sterilized with alcohol. Ethyl Chloride was used as a topical anesthetic. Routine sterile technique. \par  \par EACH KNEE JOINT  was injected utilizing ULTRASOUND GUIDANCEwith PRP 4CC. The injection was completed without complication and a bandage was applied.\par \par The patient tolerated the procedure well and was given post-injection instructions.Rec: Cold therapy, analgesics, avoid heavy activity.\par \par MEDICATION: PRP 4CC - BILATERAL KNEES  ([email or web address])\par

## 2022-01-06 ENCOUNTER — APPOINTMENT (OUTPATIENT)
Dept: ORTHOPEDIC SURGERY | Facility: CLINIC | Age: 77
End: 2022-01-06
Payer: COMMERCIAL

## 2022-01-06 DIAGNOSIS — S40.011A CONTUSION OF RIGHT SHOULDER, INITIAL ENCOUNTER: ICD-10-CM

## 2022-01-06 PROCEDURE — 99213 OFFICE O/P EST LOW 20 MIN: CPT

## 2022-01-06 PROCEDURE — 73030 X-RAY EXAM OF SHOULDER: CPT | Mod: RT

## 2022-01-06 NOTE — PHYSICAL EXAM
[de-identified] : POST OP RIGHT  SHOULDER EXAM \par AUGUST 3, 2021- RIGHT 15MM ROT CUFF REPAIR, REX\par \par \par PORTALS HEALING WELL\par \par AROM 130 / 110 \par RC = 4 / 5 \par DISTAL CMS INTACT\par \par  [de-identified] : RIGHT SHOULDER XRAY (2 VIEWS - AP AND OUTLET)  -  \par NO OBVIOUS FRACTURE OR DISLOCATION, \par SATISFACTORY DECOMPRESSION NOTED  , \par ANCHOR SILHOUETTE VISIBLE IN GREATER TUBEROSITY- SATISFACTORY POSITION\par NO NEW INJURY SEEN\par

## 2022-01-06 NOTE — HISTORY OF PRESENT ILLNESS
[de-identified] : RIGHT SHOULDER CHRONIC PAIN \par FOLLOW UP 4\par POST OP\par DECEMBER 31 , 2021- NEW INJURY- A SUITCASE FELL ON PT ARM  \par PAIN RADIATES DOWN ARM \par PAIN LEVEL: 8-9/10 \par AUGUST 3, 2021- RIGHT 15 MM ROT CUFF REPAIR, REX\par PT IS GOING TO P.T- 2 X WEEK- HELPFUL\par AT HOME EXERCISES-HELPFUL \par  \par

## 2022-01-06 NOTE — DISCUSSION/SUMMARY
[de-identified] : NEW INJURY - CONTUSSION RIGHT SHOUDLER \par XRAYS - UNREMARKABLE\par AROM - BASICALLY UNCHANGED\par \par PAUSE P.T. 2 WEEKS\par KEEP DOING PULLEY DAILY \par \par RESUME PT 2 WEEK\par \par OFFICE FU 1 MONTH

## 2022-01-19 ENCOUNTER — APPOINTMENT (OUTPATIENT)
Dept: ORTHOPEDIC SURGERY | Facility: CLINIC | Age: 77
End: 2022-01-19

## 2022-02-02 ENCOUNTER — APPOINTMENT (OUTPATIENT)
Dept: ORTHOPEDIC SURGERY | Facility: CLINIC | Age: 77
End: 2022-02-02
Payer: COMMERCIAL

## 2022-02-02 PROCEDURE — 20605 DRAIN/INJ JOINT/BURSA W/O US: CPT | Mod: LT

## 2022-02-02 PROCEDURE — 99213 OFFICE O/P EST LOW 20 MIN: CPT | Mod: 25

## 2022-02-02 PROCEDURE — 73610 X-RAY EXAM OF ANKLE: CPT | Mod: LT

## 2022-02-02 NOTE — PROCEDURE
[FreeTextEntry1] : After discussion of the risks and benefits, the patient has elected to proceed with an injection. Confirmed that the patient does not have a history of prior adverse reactions, active infections are relevant allergies. There was no erythema, warmth and the skin was clear. The skin was sterilized with alcohol. Ethyl chloride was used as a topical anesthetic. A needle was inserted. The site was injected with a mixture of Kenalog and local anesthetic. The injection was completed without complication and a bandage was applied. The patient tolerated the procedure well and was given post injection instructions.\par \par Medications:bilateralKenalog 10 mg and 3 cc 1% lidocaine was injected to each ankle the right ankle had an anterolateral injection to left ankle had a peroneal injection.

## 2022-02-02 NOTE — PHYSICAL EXAM
[de-identified] : Right ankle shows no warmth or some swelling pain is anterolateral the joint negative anterior drawer no pain of her peroneals for range of motion neurovascular exam is normal\par \par Left ankle shows mild warmth mild swelling tenderness of the peroneus tendons. There is some anterolateral pain. Range of motion neurovascular exam is normal [de-identified] : Bilateral ankle x-ray shows no evidence of acute fracture or dislocation no evidence of degeneration

## 2022-02-02 NOTE — DISCUSSION/SUMMARY
[Medication Risks Reviewed] : Medication risks reviewed [de-identified] : Options for treatment have been discussed. I suggested the possibility of oral anti-inflammatories injection and physical therapy. She was given an injection today into both ankles. She will follow up in 3 weeks for clinical check. May need to do an additional injection. Over-the-counter medication as needed.\par indications for injection in the right ankle it is anterolateral impingement and left ankle peroneal tendinitis

## 2022-02-17 ENCOUNTER — APPOINTMENT (OUTPATIENT)
Dept: ORTHOPEDIC SURGERY | Facility: CLINIC | Age: 77
End: 2022-02-17
Payer: COMMERCIAL

## 2022-02-17 PROCEDURE — 99213 OFFICE O/P EST LOW 20 MIN: CPT | Mod: 25

## 2022-02-17 PROCEDURE — 20611 DRAIN/INJ JOINT/BURSA W/US: CPT | Mod: LT

## 2022-02-17 NOTE — DISCUSSION/SUMMARY
[de-identified] : XRAYS REVEAL KNEE OSTEOARTHRITIS\par LITTLE RELIEF FROM NSAIDS , EXERCISES \par PATIENT HAS ELECTED TO PROCEED WITH PRP INJECTION KNEES (KIT PROVIDED BY PATIENT) \par RISKS AND BENEFITS DISCUSSED - VERBAL CONSENT OBTAINED \par \par \par POST INJECTION INSTRUCTIONS\par \par COLD THERAPY , ANALGESICS PRN\par \par HOME STRETCHING AND EXERCISES QD - KNEE OA HANDOUT PROVIDED, REVIEWED AND DEMONSTRATED

## 2022-02-17 NOTE — HISTORY OF PRESENT ILLNESS
[de-identified] : CHRONIC BILATERAL KNEE PAIN\par RIGHT WORSE THAN LEFT \par MAY 7, 2012 - RIGHT PARTIAL LATERAL MENISC , MICROFRACTURE OCG PATELLA 5MM\par FOLLOW UP \par CONSTANT\par PAIN LEVEL : 8/10\par MONOVISC INJECTION-11/11/2019-HELPFUL \par HAD PRP IN JAN 2019- HELPFUL \par WORSE AT NIGHT, GOING DOWN STAIRS \par OCTOBER 11, 2021- PREVIOUS CORTISONE INJECTION -HELPFUL FOR 3 WEEK \par PRP TODAY-KITS PROVIDED BY PATIENT

## 2022-02-17 NOTE — PHYSICAL EXAM
[de-identified] : PHYSICAL EXAM BILATERAL KNEES\par \par PAIN AND TENDERNESS LATERAL JOINT RADIATES DOWN LEG \par \par AROM\par RIGHT 10- 115\par LEFT 0- 130\par \par SPECIAL TESTS\par \par PATELLAR GRIND = GRIND \par DRAWER = NEG\par LACHMAN = NEG\par MACMURRAY = POS RIGHT KNEE \par \par MOTOR = GROSSLY INTACT\par SENSORY = GROSSLY INTACT \par \par \par DISTAL CMS INTACT

## 2022-02-17 NOTE — PROCEDURE
[de-identified] : INJECTION / ASPIRATION BILATERAL KNEES\par \par Patient has demonstrated limited relief from NSAIDS, rest, exercises / PT , and after discussion of the risks and benefits, the patient has elected to proceed with a\par n ULTRASOUND GUIDED corticosteroid injection into the BILATERAL SupeLat PF Knee\par  \par Confirmed that the patient does not have history of prior adverse reactions, active, infections, or relevant allergies. There was no effusion, erythema, or warmth, and the skin was clear\par \par The skin was sterilized with alcohol. Ethyl Chloride was used as a topical anesthetic. Routine sterile technique. \par  \par EACH KNEE JOINT was injected utilizing ULTRASOUND GUIDANCEwith PRP 4CC. The injection was completed without complication and a bandage was applied.\par \par The patient tolerated the procedure well and was given post-injection instructions.Rec: Cold therapy, analgesics, avoid heavy activity.\par \par MEDICATION: PRP 4CC - BILATERAL KNEES ([email or web address])\par . \par ASPIRATE:  RIGHT KNEE 5CC CLEAR YELLOW

## 2022-02-24 ENCOUNTER — APPOINTMENT (OUTPATIENT)
Dept: ORTHOPEDIC SURGERY | Facility: CLINIC | Age: 77
End: 2022-02-24
Payer: COMMERCIAL

## 2022-02-24 VITALS — BODY MASS INDEX: 28.35 KG/M2 | HEIGHT: 63 IN | WEIGHT: 160 LBS

## 2022-02-24 DIAGNOSIS — M25.572 PAIN IN LEFT ANKLE AND JOINTS OF LEFT FOOT: ICD-10-CM

## 2022-02-24 PROCEDURE — 20605 DRAIN/INJ JOINT/BURSA W/O US: CPT | Mod: LT

## 2022-02-24 NOTE — DISCUSSION/SUMMARY
[Medication Risks Reviewed] : Medication risks reviewed [de-identified] : Injections were given into both ankle stay. They were done for anterior lateral impingement and peroneal tendinitis. Patient tolerated postinjection instructions given. Followup will be as needed. Future injections discussed.

## 2022-02-24 NOTE — REASON FOR VISIT
[Follow-Up Visit] : a follow-up visit for [Foot Pain] : foot pain [FreeTextEntry2] : bilateral inj  Kenalog 10mg

## 2022-02-24 NOTE — HISTORY OF PRESENT ILLNESS
[FreeTextEntry1] : This patient is here for followup evaluation. She got significant improvement with the injection that was done in both ankles. The pain has reduced at least 50%. She is heading back to school tomorrow and will be and will be on her feet significantly. She has noted some swelling in the left ankle.

## 2022-03-10 ENCOUNTER — APPOINTMENT (OUTPATIENT)
Dept: ORTHOPEDIC SURGERY | Facility: CLINIC | Age: 77
End: 2022-03-10
Payer: COMMERCIAL

## 2022-03-10 PROCEDURE — 99213 OFFICE O/P EST LOW 20 MIN: CPT | Mod: 25

## 2022-03-10 PROCEDURE — 20552 NJX 1/MLT TRIGGER POINT 1/2: CPT

## 2022-03-10 NOTE — HISTORY OF PRESENT ILLNESS
[de-identified] : RIGHT SHOULDER CHRONIC PAIN \par FOLLOW UP \par POST OP 7 MONTHS \par DECEMBER 31 , 2021- NEW INJURY- A SUITCASE FELL ON PT ARM \par PAIN RADIATES DOWN ARM \par RADIATES UP NECK \par TIGHTNESS IN NECK \par PAIN LEVEL: 8-9/10 \par AUGUST 3, 2021- RIGHT 15 MM ROT CUFF REPAIR, REX\par PT WAS  GOING TO P.T- 2 X WEEK- HELPFUL\par AT HOME EXERCISES-HELPFUL

## 2022-03-10 NOTE — PHYSICAL EXAM
[de-identified] : POST OP RIGHT SHOULDER EXAM \par AUGUST 3, 2021- RIGHT 15MM ROT CUFF REPAIR, REX\par \par \par PORTALS HEALING WELL\par \par RIGHT AROM 140 / 120 / 70 / 20 \par RC = 4 / 5 \par DISTAL CMS INTACT\par

## 2022-03-10 NOTE — PROCEDURE
[de-identified] : TRIGGER POINT INJECTIONS\par \par Patient has demonstrated limited relief from NSAIDS, rest, exercises / PT, and after discussion of the risks and benefits, the patient elected to proceed with a trigger point injection into the \par \par RIGHT LEVATOR and RHOMBOID x2\par \par  I confirmed no prior adverse reactions, no active infections, and no relevant allergies. \par The skin was prepped in the usual sterile manner. The site was injected with local anesthetic followed by local needling. \par The injection was completed without complication and a bandage was applied.\par  \par The patient tolerated the procedure well and was given post-injection instructions. Cold Tx x 48 hours, analgesics. prn \par Medications: 1.5cc of 1% xylocaine + 1.5cc .25% Bupivicaine + KENALOG 1MG  per site\par

## 2022-04-03 NOTE — PHYSICAL EXAM
Patient does not wish to proceed with medical care recommended by Dr. Aronld.  
Patient given information related to possible complications, up to and 
including death, which could occur as a result of leaving the hospital at this 
time.  Patient verbalizes understanding of risks involved due to leaving 
against medical advice.  Patient has signed AMA form. Patient stated he just 
want to get out of here. [de-identified] : PHYSICAL EXAM LEFT AND RIGHT   SHOULDER\par \par MILD SCAPULAR PROTRACTION\par AROM \par LEFT  140 / 140 / 90 / 30\par RIGHT   140 / 140 / 90 / 30\par TENDER: SA REGION AND PERISCAPULAR TRIGGER POINTS \par \par \par RC STRENGTH TESTING \par SS:  5/5\par SUB 5/5\par IS     5/5\par BICEPS  5/5\par \par SENSATION  - GROSSLY INTACT\par \par \par

## 2022-04-14 RX ORDER — HYALURONATE SODIUM, STABILIZED 88 MG/4 ML
88 SYRINGE (ML) INTRAARTICULAR
Qty: 2 | Refills: 0 | Status: ACTIVE | OUTPATIENT
Start: 2022-04-14

## 2022-04-14 RX ORDER — HYALURONATE SODIUM, STABILIZED 88 MG/4 ML
88 SYRINGE (ML) INTRAARTICULAR
Qty: 2 | Refills: 0 | Status: DISCONTINUED | OUTPATIENT
Start: 2020-09-10 | End: 2022-04-14

## 2022-05-12 ENCOUNTER — APPOINTMENT (OUTPATIENT)
Dept: ORTHOPEDIC SURGERY | Facility: CLINIC | Age: 77
End: 2022-05-12
Payer: COMMERCIAL

## 2022-05-12 PROCEDURE — 99213 OFFICE O/P EST LOW 20 MIN: CPT | Mod: 25

## 2022-05-12 PROCEDURE — 20552 NJX 1/MLT TRIGGER POINT 1/2: CPT

## 2022-05-12 RX ORDER — HYLAN G-F 20 16MG/2ML
48 SYRINGE (ML) INTRAARTICULAR
Qty: 2 | Refills: 0 | Status: ACTIVE | COMMUNITY
Start: 2022-05-12 | End: 1900-01-01

## 2022-05-12 RX ORDER — HYLAN G-F 20 16MG/2ML
48 SYRINGE (ML) INTRAARTICULAR
Qty: 2 | Refills: 0 | Status: ACTIVE | OUTPATIENT
Start: 2022-05-12

## 2022-05-12 NOTE — PROCEDURE
[de-identified] : TRIGGER POINT INJECTIONS\par \par Patient has demonstrated limited relief from NSAIDS, rest, exercises / PT, and after discussion of the risks and benefits, the patient elected to proceed with a trigger point injection into the \par \par RIGHT LEVATOR and RHOMBOID x2\par \par  I confirmed no prior adverse reactions, no active infections, and no relevant allergies. \par The skin was prepped in the usual sterile manner. The site was injected with local anesthetic followed by local needling. \par The injection was completed without complication and a bandage was applied.\par  \par The patient tolerated the procedure well and was given post-injection instructions. Cold Tx x 48 hours, analgesics. prn \par Medications: 1.5cc of 1% xylocaine + 1.5cc.25% Bupivicaine + KENALOG 1MG per site\par .

## 2022-05-12 NOTE — HISTORY OF PRESENT ILLNESS
[de-identified] : RIGHT SHOULDER CHRONIC PAIN \par FOLLOW UP \par POST OP 8 MONTHS \par AUGUST 3, 2021- RIGHT 15 MM ROT CUFF REPAIR, REX\par MARCH 10, 2022- TRIGGER POINTS-VERY  HELPFUL \par PT IS GOING TO PHYSICAL THERAPY- 2 X WEEK-HELPFUL  \par PAIN LEVEL: 8-9/10\par BETTER WITH TYLENOL \par \par \par HPI:\par DECEMBER 31 , 2021- NEW INJURY- A SUITCASE FELL ON PT ARM \par PAIN RADIATES DOWN ARM\par AT HOME EXERCISES-HELPFUL

## 2022-05-26 ENCOUNTER — APPOINTMENT (OUTPATIENT)
Dept: ORTHOPEDIC SURGERY | Facility: CLINIC | Age: 77
End: 2022-05-26
Payer: COMMERCIAL

## 2022-05-26 PROCEDURE — 20611 DRAIN/INJ JOINT/BURSA W/US: CPT | Mod: 50

## 2022-05-26 PROCEDURE — 99213 OFFICE O/P EST LOW 20 MIN: CPT | Mod: 25

## 2022-07-06 ENCOUNTER — APPOINTMENT (OUTPATIENT)
Dept: ORTHOPEDIC SURGERY | Facility: CLINIC | Age: 77
End: 2022-07-06

## 2022-07-06 PROCEDURE — 99213 OFFICE O/P EST LOW 20 MIN: CPT | Mod: 25

## 2022-07-06 PROCEDURE — 20553 NJX 1/MLT TRIGGER POINTS 3/>: CPT

## 2022-07-06 NOTE — HISTORY OF PRESENT ILLNESS
[de-identified] : BILATERAL KNEE PAIN\par FOLLOW UP\par RIGHT WORSE THEN LEFT\par PAIN LEVEL 8/10 LEFT, 9/10 RIGHT \par MONOVISC INJECTIONS TODAY\par PREVIOUS GEL INJECTINS VERY HELPFUL

## 2022-07-06 NOTE — HISTORY OF PRESENT ILLNESS
[de-identified] : RIGHT SHOULDER CHRONIC PAIN \par FOLLOW UP \par FLARED UP ONE WEEK AGO \par POST OP \par PAIN LEVEL: 8-9/10 \par AUGUST 3, 2021- RIGHT 15 MM ROT CUFF REPAIR, REX\par MAY 12, 2022- TRIGGER POINT INJECTION- HELPFUL \par PT IS REQUESTING TRIGGER POINT INJECTION TODAY  \par MARCH 10, 2022- TRIGGER POINTS-VERY HELPFUL \par PT IS GOING TO PHYSICAL THERAPY- 1 X WEEK-HELPFUL \par \par \par \par PREVIOUS HPI:\par DECEMBER 31 , 2021- NEW INJURY- A SUITCASE FELL ON PT ARM \par PAIN RADIATES DOWN ARM\par AT HOME EXERCISES-HELPFUL \par

## 2022-07-06 NOTE — ASSESSMENT
[FreeTextEntry1] : AUGUST 11, 2020 - LEFT 1.25CM RC REPAIR, REX\par \par AUGUST 3, 2021- RIGHT 15MM ROT CUFF REPAIR, REX

## 2022-07-06 NOTE — PROCEDURE
[de-identified] : Patient has demonstrated limited relief from NSAIDS, rest, exercises / PT , and after discussion of the risks and benefits, the patient has elected to proceed with a\par n ULTRASOUND GUIDED VISCOSUPPLEMENT injection into the BILATERAL SupeLat PF Knee\par  \par Confirmed that the patient does not have history of prior adverse reactions, active, infections, or relevant allergies. There was no effusion, erythema, or warmth, and the skin was clear\par \par The skin was sterilized with alcohol. Ethyl Chloride was used as a topical anesthetic. Routine sterile technique. \par  \par The KNEE JOINT  was injected utilizing ULTRASOUND GUIDANCE with MONOVISC 4CC. The injection was completed without complication and a bandage was applied.\par \par The patient tolerated the procedure well and was given post-injection instructions.Rec: Cold therapy, analgesics, avoid heavy activity.\par \par MEDICATION: MONOVISC 4CC\par \par EFFUSION ASPIRATED  : RIGHT 8cc\par

## 2022-07-06 NOTE — DISCUSSION/SUMMARY
[de-identified] : Start:  MG Oral Tablet; TAKE 1 TABLET 3 TIMES DAILY AFTER MEALS\par \par COMPLETE P.T. 2 MORE VISITS

## 2022-07-06 NOTE — DISCUSSION/SUMMARY
[de-identified] : XRAYS REVEAL KNEE OSTEOARTHRITIS\par LITTLE RELIEF FROM NSAIDS , EXERCISES \par PATIENT HAS ELECTED TO PROCEED WITH PRP INJECTION KNEES (KIT PROVIDED BY PATIENT) \par RISKS AND BENEFITS DISCUSSED - VERBAL CONSENT OBTAINED \par \par \par POST INJECTION INSTRUCTIONS\par \par COLD THERAPY , ANALGESICS PRN\par \par HOME STRETCHING AND EXERCISES QD - KNEE OA HANDOUT PROVIDED, REVIEWED AND DEMONSTRATED.

## 2022-07-06 NOTE — PHYSICAL EXAM
[de-identified] : PHYSICAL EXAM LEFT AND RIGHT   SHOULDER\par \par LEFT  SCAPULAR PROTRACTION\par  \par AROM \par LEFT  140 / 140 / 90 / 30\par RIGHT   140 / 130 / 70 / 20\par TENDER: BILATERAL LEVATOR AND RHOMBOID TRIGGER POINTS\par \par SPECIAL TESTING :\par IMPINGEMENT SIGNS NEGATIVE \par \par NERI - NEGATIVE \par APPREHENSION AND SUPPRESSION - NEGATIVE \par \par RC STRENGTH TESTING \par SS:  5/5\par SUB 5/5\par IS     5/5\par BICEPS  5/5\par \par SENSATION  - GROSSLY INTACT\par \par \par

## 2022-07-06 NOTE — PROCEDURE
[de-identified] : TRIGGER POINT INJECTIONS\par \par Patient has demonstrated limited relief from NSAIDS, rest, exercises / PT, and after discussion of the risks and benefits, the patient elected to proceed with a trigger point injection into the \par \par RIGHT AND LEFT  LEVATOR and RHOMBOID x2\par \par  I confirmed no prior adverse reactions, no active infections, and no relevant allergies. \par The skin was prepped in the usual sterile manner. The site was injected with local anesthetic followed by local needling. \par The injection was completed without complication and a bandage was applied.\par  \par The patient tolerated the procedure well and was given post-injection instructions. Cold Tx x 48 hours, analgesics. prn \par Medications: 1.5cc of 1% xylocaine + 1.5cc.25% Bupivicaine  \par . \par

## 2022-07-06 NOTE — PHYSICAL EXAM
[de-identified] : PHYSICAL EXAM BILATERAL KNEES\par \par PAIN AND TENDERNESS LATERAL JOINT RADIATES DOWN LEG \par \par AROM\par RIGHT 10- 115\par LEFT 0- 130\par \par SPECIAL TESTS\par \par PATELLAR GRIND = GRIND \par DRAWER = NEG\par LACHMAN = NEG\par MACMURRAY = POS RIGHT KNEE \par \par MOTOR = GROSSLY INTACT\par SENSORY = GROSSLY INTACT \par \par \par DISTAL CMS INTACT

## 2022-10-27 ENCOUNTER — APPOINTMENT (OUTPATIENT)
Dept: ORTHOPEDIC SURGERY | Facility: CLINIC | Age: 77
End: 2022-10-27

## 2022-10-27 PROCEDURE — 20611 DRAIN/INJ JOINT/BURSA W/US: CPT | Mod: 50

## 2022-10-27 PROCEDURE — 99213 OFFICE O/P EST LOW 20 MIN: CPT | Mod: 25

## 2022-10-27 RX ORDER — HYALURONATE SODIUM, STABILIZED 88 MG/4 ML
88 SYRINGE (ML) INTRAARTICULAR
Qty: 2 | Refills: 0 | Status: ACTIVE | OUTPATIENT
Start: 2022-10-27

## 2022-10-27 NOTE — DISCUSSION/SUMMARY
[de-identified] : XRAYS REVEAL KNEE OSTEOARTHRITIS\par LITTLE RELIEF FROM NSAIDS , EXERCISES \par PATIENT HAS ELECTED TO PROCEED WITH KENALOG INJECTION KNEE  \par RISKS AND BENEFITS DISCUSSED - VERBAL CONSENT OBTAINED \par SEE PROCEDURE NOTE\par \par \par POST INJECTION INSTRUCTIONS:\par \par INJECTION THERAPY HANDOUT PROVIDED\par \par COLD THERAPY , ANALGESICS PRN\par \par HOME STRETCHING AND EXERCISES QD  -  HANDOUT PROVIDED, REVIEWED AND DEMONSTRATED \par \par START PHYSICAL THERAPY 2 X 4-6 WEEKS\par PROGRESS TO HOME EXERCISES\par

## 2022-10-27 NOTE — HISTORY OF PRESENT ILLNESS
[de-identified] : BILATERAL KNEE PAIN\par FOLLOW UP\par RIGHT WORSE THEN LEFT\par PAIN LEVEL 8-9/10 \par PREVIOUS MONOVISC INJ-HELPFUL \par PT IS REQUESTING CORTISONE  INJECTIONS TODAY\par

## 2022-10-27 NOTE — PHYSICAL EXAM
[de-identified] : PHYSICAL EXAM BILATERAL KNEES\par \par PAIN AND TENDERNESS LATERAL JOINT RADIATES DOWN LEG \par \par AROM\par RIGHT 10- 115\par LEFT 0- 130\par \par SPECIAL TESTS\par \par PATELLAR GRIND = GRIND \par DRAWER = NEG\par LACHMAN = NEG\par MACMURRAY = POS RIGHT KNEE \par \par MOTOR = GROSSLY INTACT\par SENSORY = GROSSLY INTACT \par \par \par DISTAL CMS INTACT \par

## 2022-10-27 NOTE — PROCEDURE
[de-identified] : INJECTION / ASPIRATION BILATERAL KNEES\par \par Patient has demonstrated limited relief from NSAIDS, rest, exercises / PT , and after discussion of the risks and benefits, the patient has elected to proceed with a\par n ULTRASOUND GUIDED corticosteroid injection into the BILATERAL SupeLat PF Knee\par  \par Confirmed that the patient does not have history of prior adverse reactions, active, infections, or relevant allergies. There was no effusion, erythema, or warmth, and the skin was clear\par \par The skin was sterilized with alcohol. Ethyl Chloride was used as a topical anesthetic. Routine sterile technique. \par  \par The KNEE JOINT  was injected utilizing ULTRASOUND GUIDANCEwith KENALOG + LIDOCAINE. The injection was completed without complication and a bandage was applied.\par \par The patient tolerated the procedure well and was given post-injection instructions.Rec: Cold therapy, analgesics, avoid heavy activity.\par \par MEDICATION: Lidocaine 1% 4cc + 10mg of Kenalog\par

## 2022-11-10 ENCOUNTER — APPOINTMENT (OUTPATIENT)
Dept: ORTHOPEDIC SURGERY | Facility: CLINIC | Age: 77
End: 2022-11-10

## 2022-11-10 PROCEDURE — 20611 DRAIN/INJ JOINT/BURSA W/US: CPT | Mod: 50

## 2022-11-10 PROCEDURE — 99213 OFFICE O/P EST LOW 20 MIN: CPT | Mod: 25

## 2022-11-10 NOTE — PROCEDURE
[de-identified] : Patient has demonstrated limited relief from NSAIDS, rest, exercises / PT , and after discussion of the risks and benefits, the patient has elected to proceed with a\par n ULTRASOUND GUIDED VISCOSUPPLEMENT injection into the BILATERAL SupeLat PF Knee\par  \par Confirmed that the patient does not have history of prior adverse reactions, active, infections, or relevant allergies. There was no effusion, erythema, or warmth, and the skin was clear\par \par The skin was sterilized with alcohol. Ethyl Chloride was used as a topical anesthetic. Routine sterile technique. \par  \par The KNEE JOINT  was injected utilizing ULTRASOUND GUIDANCE with MONOVISC 4CC. The injection was completed without complication and a bandage was applied.\par \par The patient tolerated the procedure well and was given post-injection instructions.Rec: Cold therapy, analgesics, avoid heavy activity.\par \par MEDICATION: MONOVISC 4CC\par \par EFFUSION ASPIRATED  : NONE \par

## 2022-11-10 NOTE — PHYSICAL EXAM
[de-identified] : PHYSICAL EXAM BILATERAL KNEES\par \par PAIN AND TENDERNESS LATERAL JOINT RADIATES DOWN LEG \par \par AROM\par RIGHT 10- 115\par LEFT 0- 130\par \par SPECIAL TESTS\par \par PATELLAR GRIND = GRIND \par DRAWER = NEG\par LACHMAN = NEG\par MACMURRAY = POS RIGHT KNEE \par \par MOTOR = GROSSLY INTACT\par SENSORY = GROSSLY INTACT \par \par \par DISTAL CMS INTACT \par

## 2022-11-10 NOTE — REASON FOR VISIT
[Follow-Up Visit] : a follow-up visit for [Knee Pain] : knee pain [FreeTextEntry2] : kiersten knee monovisc

## 2022-11-10 NOTE — HISTORY OF PRESENT ILLNESS
[de-identified] : BILATERAL KNEE PAIN\par CALE KNEE MONOVISC \par RIGHT WORSE THEN LEFT\par PAIN LEVEL 9/10 (9 ON THE RIGHT KNEE AND 8 ON THE LEFT)\par

## 2022-11-10 NOTE — DISCUSSION/SUMMARY
[de-identified] : XRAYS REVEAL KNEE OSTEOARTHRITIS\par LITTLE RELIEF FROM NSAIDS , EXERCISES \par PATIENT HAS ELECTED TO PROCEED WITH MONOVISC INJECTION KNEE  \par RISKS AND BENEFITS DISCUSSED - VERBAL CONSENT OBTAINED \par SEE PROCEDURE NOTE\par \par \par POST INJECTION INSTRUCTIONS:\par \par INJECTION THERAPY HANDOUT PROVIDED\par \par COLD THERAPY , ANALGESICS PRN\par \par HOME STRETCHING AND EXERCISES QD

## 2022-11-17 ENCOUNTER — APPOINTMENT (OUTPATIENT)
Dept: ORTHOPEDIC SURGERY | Facility: CLINIC | Age: 77
End: 2022-11-17

## 2022-11-17 DIAGNOSIS — M25.571 PAIN IN RIGHT ANKLE AND JOINTS OF RIGHT FOOT: ICD-10-CM

## 2022-11-17 PROCEDURE — 99213 OFFICE O/P EST LOW 20 MIN: CPT | Mod: 25

## 2022-11-17 PROCEDURE — 20553 NJX 1/MLT TRIGGER POINTS 3/>: CPT

## 2022-11-17 NOTE — DISCUSSION/SUMMARY
[de-identified] : 6X TRIGGER POINT INJECTIONS \par \par COLD PACKS,  PRN\par \par GABAPENTIN 3 TABS DAY\par \par PT - PROGRESS TO HOME EXERCISES

## 2022-11-17 NOTE — HISTORY OF PRESENT ILLNESS
[de-identified] : RIGHT SHOULDER PAIN \par FOLLOW UP \par PREVIOUS TRIGGER POINT INJECTION-JULY 6, 2022- -HELPFUL \par PAIN LEVEL: 8-9/10 \par TRIGGER POINT INJECTION TODAY\par \par \par \par RIGHT SHOULDER CHRONIC PAIN \par FOLLOW UP \par FLARED UP ONE WEEK AGO \par POST OP \par PAIN LEVEL: 8-9/10 \par AUGUST 3, 2021- RIGHT 15 MM ROT CUFF REPAIR, REX\par MAY 12, 2022- TRIGGER POINT INJECTION- HELPFUL \par PT IS REQUESTING TRIGGER POINT INJECTION TODAY \par MARCH 10, 2022- TRIGGER POINTS-VERY HELPFUL \par PT IS GOING TO PHYSICAL THERAPY- 1 X WEEK-HELPFUL

## 2022-11-17 NOTE — PHYSICAL EXAM
[de-identified] : PHYSICAL EXAM LEFT AND RIGHT SHOULDER\par \par LEFT SCAPULAR PROTRACTION\par  \par AROM \par LEFT 140 / 140 / 90 / 30\par RIGHT 140 / 130 / 70 / 20\par TENDER: BILATERAL LEVATOR AND RHOMBOID TRIGGER POINTS\par \par SPECIAL TESTING :\par IMPINGEMENT SIGNS NEGATIVE \par \par NERI - NEGATIVE \par APPREHENSION AND SUPPRESSION - NEGATIVE \par \par RC STRENGTH TESTING \par SS: 5/5\par SUB 5/5\par IS 5/5\par BICEPS 5/5\par \par SENSATION - GROSSLY INTACT\par \par

## 2022-11-17 NOTE — PROCEDURE
[de-identified] : TRIGGER POINT INJECTIONS\par \par Patient has demonstrated limited relief from NSAIDS, rest, exercises / PT, and after discussion of the risks and benefits, the patient elected to proceed with a trigger point injection into the \par \par RIGHT AND LEFT LEVATOR and RHOMBOID x2\par \par  I confirmed no prior adverse reactions, no active infections, and no relevant allergies. \par The skin was prepped in the usual sterile manner. The site was injected with local anesthetic followed by local needling. \par The injection was completed without complication and a bandage was applied.\par  \par The patient tolerated the procedure well and was given post-injection instructions. Cold Tx x 48 hours, analgesics. prn \par Medications: 1.5cc of 1% xylocaine + 1.5cc.25% Bupivicaine +  KENALOG

## 2022-12-01 ENCOUNTER — RX RENEWAL (OUTPATIENT)
Age: 77
End: 2022-12-01

## 2023-02-01 ENCOUNTER — APPOINTMENT (OUTPATIENT)
Dept: ORTHOPEDIC SURGERY | Facility: CLINIC | Age: 78
End: 2023-02-01
Payer: COMMERCIAL

## 2023-02-01 PROCEDURE — 20553 NJX 1/MLT TRIGGER POINTS 3/>: CPT

## 2023-02-01 PROCEDURE — 99213 OFFICE O/P EST LOW 20 MIN: CPT | Mod: 25

## 2023-02-01 RX ORDER — IBUPROFEN 800 MG/1
800 TABLET ORAL 3 TIMES DAILY
Qty: 90 | Refills: 5 | Status: ACTIVE | COMMUNITY
Start: 2022-05-12 | End: 1900-01-01

## 2023-02-01 NOTE — DISCUSSION/SUMMARY
[de-identified] : 8X TRIGGER POINT INJECTIONS \par \par COLD PACKS,  PRN\par \par GABAPENTIN 3 TABS DAY\par \par PT - PROGRESS TO HOME EXERCISES

## 2023-02-01 NOTE — PROCEDURE
[de-identified] : TRIGGER POINT INJECTIONS\par \par Patient has demonstrated limited relief from NSAIDS, rest, exercises / PT, and after discussion of the risks and benefits, the patient elected to proceed with a trigger point injection into the \par \par RIGHT AND LEFT PARACERVICAL, LEVATOR and RHOMBOID x2\par \par  I confirmed no prior adverse reactions, no active infections, and no relevant allergies. \par The skin was prepped in the usual sterile manner. The site was injected with local anesthetic followed by local needling. \par The injection was completed without complication and a bandage was applied.\par  \par The patient tolerated the procedure well and was given post-injection instructions. Cold Tx x 48 hours, analgesics. prn \par Medications: 1.5cc of 1% xylocaine + 1.5cc.25% Bupivicaine + KENALOG.

## 2023-02-01 NOTE — HISTORY OF PRESENT ILLNESS
[de-identified] : BILATERAL SHOULDER PAIN \par FLARED UP 3 WEEKS AGO \par FOLLOW UP \par PREVIOUS TRIGGER POINT INJECTION-JULY 6, 2022- -HELPFUL \par PREVIOUS TRIGGER POINT INJECTION-NOV 17, 2022- -HELPFUL\par PAIN LEVEL: 9/10 \par TRIGGER POINT INJECTION TODAY\par \par \par \par RIGHT SHOULDER CHRONIC PAIN \par FOLLOW UP \par FLARED UP ONE WEEK AGO \par POST OP \par PAIN LEVEL: 8-9/10 \par AUGUST 3, 2021- RIGHT 15 MM ROT CUFF REPAIR, REX\par MAY 12, 2022- TRIGGER POINT INJECTION- HELPFUL \par PT IS REQUESTING TRIGGER POINT INJECTION TODAY \par MARCH 10, 2022- TRIGGER POINTS-VERY HELPFUL \par PT IS GOING TO PHYSICAL THERAPY- 1 X WEEK-HELPFUL

## 2023-02-01 NOTE — PHYSICAL EXAM
[de-identified] : PHYSICAL EXAM LEFT AND RIGHT SHOULDER\par \par LEFT SCAPULAR PROTRACTION\par  \par AROM \par LEFT 140 / 140 / 90 / 30\par RIGHT 140 / 130 / 70 / 20\par TENDER: BILATERAL LEVATOR AND RHOMBOID TRIGGER POINTS\par \par SPECIAL TESTING :\par IMPINGEMENT SIGNS NEGATIVE \par \par NERI - NEGATIVE \par APPREHENSION AND SUPPRESSION - NEGATIVE \par \par RC STRENGTH TESTING \par SS: 5/5\par SUB 5/5\par IS 5/5\par BICEPS 5/5\par \par SENSATION - GROSSLY INTACT\par \par

## 2023-02-27 ENCOUNTER — RX RENEWAL (OUTPATIENT)
Age: 78
End: 2023-02-27

## 2023-02-27 RX ORDER — IBUPROFEN 800 MG/1
800 TABLET, FILM COATED ORAL
Qty: 90 | Refills: 0 | Status: ACTIVE | COMMUNITY
Start: 2022-12-01 | End: 1900-01-01

## 2023-03-09 NOTE — PROCEDURE
[de-identified] : TRIGGER POINT INJECTIONS\par \par Patient has demonstrated limited relief from NSAIDS, rest, exercises / PT, and after discussion of the risks and benefits, the patient elected to proceed with a trigger point injection into the \par LEFT LEVATOR and RHOMBOID\par RIGHT LEVATOR and RHOMBOID\par \par  I confirmed no prior adverse reactions, no active infections, and no relevant allergies. \par The skin was prepped in the usual sterile manner. The site was injected with local anesthetic followed by local needling. \par The injection was completed without complication and a bandage was applied.\par  \par The patient tolerated the procedure well and was given post-injection instructions. Cold Tx x 48 hours, analgesics. prn \par Medications: 1.5cc of 1% xylocaine + 1.5cc .25% Bupivicaine + KENALOG 1MG  per site\par \par  Hydroquinone Pregnancy And Lactation Text: This medication has not been assigned a Pregnancy Risk Category but animal studies failed to show danger with the topical medication. It is unknown if the medication is excreted in breast milk.

## 2023-04-07 ENCOUNTER — APPOINTMENT (OUTPATIENT)
Dept: PHYSICAL MEDICINE AND REHAB | Facility: CLINIC | Age: 78
End: 2023-04-07
Payer: COMMERCIAL

## 2023-04-07 ENCOUNTER — RESULT REVIEW (OUTPATIENT)
Age: 78
End: 2023-04-07

## 2023-04-07 ENCOUNTER — OUTPATIENT (OUTPATIENT)
Dept: OUTPATIENT SERVICES | Facility: HOSPITAL | Age: 78
LOS: 1 days | End: 2023-04-07
Payer: COMMERCIAL

## 2023-04-07 DIAGNOSIS — M75.81 OTHER SHOULDER LESIONS, RIGHT SHOULDER: ICD-10-CM

## 2023-04-07 DIAGNOSIS — Z98.890 OTHER SPECIFIED POSTPROCEDURAL STATES: ICD-10-CM

## 2023-04-07 PROCEDURE — 99215 OFFICE O/P EST HI 40 MIN: CPT

## 2023-04-07 PROCEDURE — 72052 X-RAY EXAM NECK SPINE 6/>VWS: CPT

## 2023-04-07 PROCEDURE — 72052 X-RAY EXAM NECK SPINE 6/>VWS: CPT | Mod: 26

## 2023-04-07 PROCEDURE — 99205 OFFICE O/P NEW HI 60 MIN: CPT

## 2023-04-07 NOTE — PHYSICAL EXAM
[FreeTextEntry1] : GEN: NAD, well dressed, well nourished\par HEENT: NCAT, oropharynx clear\par CV: S1S2, RRR\par RESP: on room air, no labored breathing\par ABD: non distended\par EXT: well perfused, no calf tenderness\par \par Gen: A+O x 3 in NAD\par Psych: Normal mood and affect. Responds appropriately to commands\par Eyes: Anicteric. No discharge. EOMI.\par Resp: Breathing unlabored\par CV: radial pulses 2+ and equal. No varicosities noted\par Ext: No c/c/e\par Skin: No lesions noted\par \par Gait:\par Non antalgic \par +  reciprocating heel to toe\par able to stand on toes and heels WITH hand holding\par Tandem gait intact WITH hand holding\par Poor single leg standing balance\par Romberg negative\par \par Inspection: Spine alignment is midline, with no evidence of scoliosis. Iliac crest heights and PSIS heights level. \par + Forward head position.\par \par Palpation: There is + tenderness over the upper traps, middle traps, levator scaps, rhomboids, articular pillars, cervical paraspinals, right >>LEFT\par \par Cervical ROM: Flexion, extension, side-bending, rotation, limited due to pain with most pain at terminal ROM \par Finger to nose bilaterally intact\par \par 	C5 (Shoulder Abduction)        C5 (Elbow Flex)	        C6 (Wrist Ext)   \par Right	4/5	                                5/5	                          5/5	       \par Left	4/5	                                5/5	                          5/5	          \par \par 	C7 (Elbow Ext)            C7 (Wrist Flex)             C8 (Long Finger Flex)          T1 (Finger Abduct)           \par Right	5/5	                    5/5                                      5/5	                                      5/5	                                                 \par Left	5/5	                    5/5                                      5/5	                                      5/5	                               \par \par 	C8 (Thumb Ext)            C8 & T1 (Thumb Opp)            \par Right	5/5	                           5/5	                                                 \par Left	5/5	                           5/5                             \par \par \par Tone: Normal. No cog wheeling. \par Proprioception at DIPs intact B/L\par Sensation: Grossly intact to light touch and pinprick bilateral upper extremities.\par Reflexes: 2+ symmetric biceps, pronators, brachioradialis, triceps\par Bowser’s Sign negative\par Spurling's Sign negative\par Shoulder Abduction Test (Bakody’s) absent\par Lhermitte’s Sign negative\par \par RIGHT SHOULDER:\par neg effusion\par neg scars or lacerations or lesions\par neg increased warmth\par +scapular winging, prominence of the right medial border\par neg muscle atrophy\par \par Palpation:\par no TTP over sterna-clavicular joint\par no TTP over clavicle\par no TTP over coracoid process\par no TTP over sternum\par +TTP over AC joint\par no TTP over humerus\par ++TTP over the spine of the scapula\par +TTP over the medial border of the scapula, superior angle, inferior angle, lateral border\par +TTP over supraspinatus\par no TTP over infraspinatus\par ++TTP over upper trapezius\par + TTP over deltoid muscle\par no TTP in the axilla\par neg crepitus with PROM shoulder\par \par AROM:\par active range of motion limited in most planes as below\par scapulohumeral rhythm was not smooth, appropriate \par \par Abduction 130/170-180\par Forward Flexion 130/160-180\par Elevation through the plane of the scapula 130/170-180\par External Rotation 45/80-90\par Internal Rotation 60/\par Extension 25/50-60\par Adduction 25/50-75\par \par PROM consistent with above\par \par neg sulcus sign\par +Neer test\par +Mcneal test\par ++Alexy’s test\par neg Speed’s test\par neg Yergason’s test\par equivocal drop arm test\par +empty can test\par neg Culebra's Test\par neg external rotation lag sign\par neg lift off sign\par ++hornblower’s sign\par +Horizontal adduction test\par \par Sensation to light touch intact over all dermatomes about the shoulder\par Distal pulses present\par \par LEFT SHOULDER:\par neg effusion\par neg scars or lacerations or lesions\par neg increased warmth\par neg scapular winging\par neg muscle atrophy\par \par Palpation:\par no TTP over sterna-clavicular joint\par no TTP over clavicle\par no TTP over coracoid process\par no TTP over sternum\par +TTP over AC joint\par no TTP over humerus\par no TTP over the spine of the scapula\par no TTP over the medial border of the scapula, superior angle, inferior angle, lateral border\par +TTP over supraspinatus\par no TTP over infraspinatus\par +TTP over upper trapezius\par no TTP over deltoid muscle\par no TTP in the axilla\par neg crepitus with PROM shoulder\par \par AROM:\par active range of motion limited in most planes as below\par scapulohumeral rhythm was not smooth, appropriate \par \par Abduction 150/170-180\par Forward Flexion 150/160-180\par Elevation through the plane of the scapula 150/170-180\par External Rotation 45/80-90\par Internal Rotation 60/\par Extension 25/50-60\par Adduction 25/50-75\par \par PROM consistent with above\par \par neg sulcus sign\par neg Neer test\par +Mcneal test\par +Alexy’s test\par neg Speed’s test\par neg Yergason’s test\par neg drop arm test\par neg empty can test\par neg Culebra's Test\par neg external rotation lag sign\par neg lift off sign\par neg hornblower’s sign\par neg Horizontal adduction test\par \par Sensation to light touch intact over all dermatomes about the shoulder\par Distal pulses present\par \par \par Manual Muscle Testing\par \par \par 	C5 (Shoulder Abduction)        C5 (Elbow Flex)	        C6 (Wrist Ext)   \par Right	4/5 5/5 5/5	       \par Left	4/5 5/5 5/5	          \par \par 	C7 (Elbow Ext)            C7 (Wrist Flex)             C8 (Long Finger Flex)          T1 (Finger Abduct)           \par Right	5/5 5/5 5/5 5/5	                                                 \par Left	5/5 5/5 5/5 5/5	                               \par \par 	C8 (Thumb Ext)            C8 & T1 (Thumb Opp)            \par Right	5/5 5/5	                                                 \par Left	5/5 5/5                             \par \par Resisted Internal Rotation\par Right 4/5\par Left 4/5\par \par Resisted External Rotation\par Right 4/5\par Left 4/5\par \par \par

## 2023-04-07 NOTE — ASSESSMENT
[FreeTextEntry1] :                                                       Assessment/Plan:\par \par BRYAN PRYOR is a 78 year female with chronic B/L shoulder pain here for initial consultation.\par \par Rotator cuff tendinitis, Right > Left\par Subacromial bursitis, right\par Scapular dyskinesis, right > left\par Myofascial pain syndrome, cervical/thoracic\par Cervicalgia\par Cervical facet mediate pain R>>L\par Chronic cervical radiculopathy, C5, right > left\par Postural imbalance\par \par On chronic NSAIDs\par \par - Tiers of treatment and management of above diagnosis(es) were discussed with patient\par - Optimal diet, weight, sleep, and lifestyle management to minimize stress and maximize well being counseling provided\par - Imaging reviewed and discussed with patient\par - Reviewed previous encounter notes from 2/1/2023 Dr. SORTO.P> Re (Ortho Surgery Shoulder) \par - Patient was advised to start a structured, targeted therapy program 2-3x/wk for 8 wks with goal toward HEP\par - Patient was educated on an appropriate home exercise program, provided with exercise recommendations, all questions answered\par - Patient may trial acetaminophen 1000mg up to TID PRN moderate to severe pain and to decrease average consumption of NSAIDs\par - Patient was advised to continue gabapentin for their neuropathic pain symptoms. May continue 600mg BID. Patient provided with written instructions as well. All questions were answered and the patient displayed a clear understanding of the plan of care, including titration of gabapentin. The patient was informed about not taking this medication at the same time as any sleeping or muscle relaxant pills. Pt was also notified to stop, and contact our office, if it is too sedating, ankle swelling occurs and/or they experience suicidal thinking.\par - Patient may trial topical compound cream to the rgiht posterior shoulder no more than twice per day as needed for next 2-3 weeks, Rx entered via portal, written information provided to the patient in addition to verbal explanation regarding the medication\par - Patient has been taking NSAIDs, highdose for several years, advised to have blood testing\par - Patient was advised to apply cool compresses or warm heat to affected regions PRN\par - Radiographs of cervical spine ordered today \par \par - Educated about red flag symptoms including (but not limited to) new, worsened, or persistent: fever greater than 100F, bowel or bladder incontinence, bowel obstipation, inability to void urine, urinary leakage, Severe nausea or vomiting, Worsening numbness, worsening tingling/paresthesias, and/or new or progressive motor weakness; advised to seek immediate medical attention at his nearest Emergency department should they experience any of the above\par \par - Follow up in 2-3 weeks after imaging, in person in 2 months to assess their progress\par \par I have personally spent a total of at least 65 minutes preparing, reviewing internal and external records, explaining, counseling, and coordinating care for this patient encounter.\par \[facility] Thank you, (s), for allowing me to participate in the care of your patient. Please do not hesitate to contact me with questions/concerns.\par \par Filipe Jeter M.D.\[facility] Sports and Interventional Spine\[facility] Department of Physical Medicine and Rehabilitation \[facility] \par Email: [email or web address] \par [facility]\par [facility] [address]\par [facility], 11th Floor\[place] [address]\[facility] \[facility] Appointments: [phone]\par Fax: [phone]\[facility]

## 2023-04-07 NOTE — HISTORY OF PRESENT ILLNESS
[FreeTextEntry1] : Filipe Jeter M.D.\par Sports Medicine and Interventional Spine\[facility] Department of Physical Medicine and Rehabilitation \[facility] \[facility] Email: [email or web address] <[email or web address]>\par \par [facility] Orthopaedic [place] [facility]\par [address]\par [address], [address]th Floor\[place] [address]\par \par [facility] [place] at [facility]\par [address], 4th Floor\[place] [address]\par \[facility]  \par [facility]\par [address], 6th [address]\par [place], [place] [address] \[facility] For [place] Appointments\par Phone: [phone]\par Fax: [phone]\par \par ----------------------------------------------------------------------------------------------------------------------------------------\par \par PATIENT: BRYAN PRYOR \[facility] MRN: [id number] \par DATE OF BIRTH: Glen 10 1945 \par DATE OF SERVICE: 04/07/2023 \par \par Apr 07, 2023 \[facility] \par \par Dear Drs.\par \par Thank you for referring BRYAN PRYOR to my Sports and Interventional Spine practice and office. Enclosed is a copy of the patient's consultation/progress note, which includes my complete assessment and recent studies completed during the patient's evaluation.\par \par If you have questions or have any patients who require nonsurgical, non-opiate management of any sports, spine, or musculoskeletal conditions, please do not hesitate to contact my , Sherie Walton at [phone].\par \par I look forward to taking care of your patients along with you.\par \par Sincerely,\par \par Filipe\par \par Filipe Jeter MD\par Sports, Interventional Spine, & Regenerative Musculoskeletal Medicine\par [facility] [place] at [facility]\par Email: [email or web address]\par \par \par                                                   Initial Consultation:\par CC: b/l shoulder pain \par \par HPI:  This is the first visit to [facility] [place] at [facility] Sports Medicine and Interventional Spine Practice.  \par \par BRYAN PRYOR presents with the chief complaint as above.  \par \par Initial Hx on 04/07/2023 :\par Presents in person to [facility]\par patient with h/o b/l arthroscopic surgical intervention of her shoulder, 2019\par patient relates falling in a 2021 on outstretched hand\par lately their right shoulder pain is now accompanied with neck stiffness\par receives trigger point injections for the shoulders, neck\par patient attending PT at [facility], going 1-2 x per week, last sessions 4/1/2023 on and off for the past year\par The patient’s difficulties began 2021\par The pain is graded as 5-6/10\par The pain is described as sharp, throbbing shooting stabbing\par The pain is constant\par The pain does not radiate over the left and right trapezius right scalenes; new location for her pain over the past few months\par The patient feels that the pain is overall persistent \par Patient denies other recent fall, MVA, injury, trauma, or accident besides presenting history above\par \par Aggravating: carrying heavy items, heavy lifting, can happen at rest without heavy loads\par Alleviating: rest, activity modification, avoiding heavy lifting or back packs, pharmacologic treatments\par \par Meds: denies regular PO pain medications\par Therapy Program: no recent structured targeted therapy program\par HEP: doing HEP regularly\par \par Assoc Sx:\par Denies numbness, Tingling\par Denies Focal motor weakness in the upper or lower limbs\par Denies New or worsened bowel or bladder incontinence\par Denies Saddle anesthesia\par Denies Using Orthotic(s)/Supportive devices\par Denies Swelling in the upper/lower extremities\par They also deny frequent tripping, falling\par \par ROS: A 14 point review of systems was completed. Positive findings are pain as described above. The remaining systems negative.\par \par Breast Cancer Surveillance: up to date\par COVID HX: reviewed\par \par Assoc Hx:\par Ambulates without assistive device\par Injection Hx: denies locally directed treatment to the area in question\par Imaging Hx: reviewed\par \par Level of functioning: indep with ambulation, indep with ADLs\par Living Situation: elevator accessible apartment dwelling with steps to enter

## 2023-04-12 LAB
ALBUMIN SERPL ELPH-MCNC: 4.5 G/DL
ALP BLD-CCNC: 79 U/L
ALT SERPL-CCNC: 14 U/L
ANION GAP SERPL CALC-SCNC: 13 MMOL/L
AST SERPL-CCNC: 14 U/L
BILIRUB SERPL-MCNC: 0.3 MG/DL
BUN SERPL-MCNC: 25 MG/DL
CALCIUM SERPL-MCNC: 9.8 MG/DL
CHLORIDE SERPL-SCNC: 101 MMOL/L
CK SERPL-CCNC: 52 U/L
CO2 SERPL-SCNC: 28 MMOL/L
CREAT SERPL-MCNC: 0.84 MG/DL
CRP SERPL-MCNC: 3 MG/L
EGFR: 71 ML/MIN/1.73M2
ERYTHROCYTE [SEDIMENTATION RATE] IN BLOOD BY WESTERGREN METHOD: 2 MM/HR
GLUCOSE SERPL-MCNC: 124 MG/DL
POTASSIUM SERPL-SCNC: 5.1 MMOL/L
PROT SERPL-MCNC: 6.2 G/DL
SODIUM SERPL-SCNC: 142 MMOL/L

## 2023-04-17 RX ORDER — HYALURONATE SOD, CROSS-LINKED 30 MG/3 ML
30 SYRINGE (ML) INTRAARTICULAR
Qty: 2 | Refills: 0 | Status: ACTIVE | OUTPATIENT
Start: 2023-04-17

## 2023-04-21 ENCOUNTER — APPOINTMENT (OUTPATIENT)
Dept: ORTHOPEDIC SURGERY | Facility: CLINIC | Age: 78
End: 2023-04-21
Payer: COMMERCIAL

## 2023-04-21 PROCEDURE — 20553 NJX 1/MLT TRIGGER POINTS 3/>: CPT

## 2023-04-21 PROCEDURE — 99214 OFFICE O/P EST MOD 30 MIN: CPT | Mod: 25

## 2023-04-21 RX ORDER — CYCLOBENZAPRINE HYDROCHLORIDE 5 MG/1
5 TABLET, FILM COATED ORAL 3 TIMES DAILY
Qty: 21 | Refills: 1 | Status: ACTIVE | COMMUNITY
Start: 2023-04-21 | End: 1900-01-01

## 2023-04-21 NOTE — PROCEDURE
[de-identified] : Procedure: Trigger Point Injections with corticosteroid (4 muscles)\par Pre-Procedure Diagnosis: neck pain\par Post-Procedure Diagnosis: same\par \par The patient was educated about the risks and benefits of a corticosteroid injection.  Alternatives were discussed.  The patient understood and consented for the procedure.\par \par The area was sterilely prepped using isopropyl alcohol.  An ethyl chloride spray provided  local anesthesia.  Using the usual sterile technique, 1 ml of 40mg/1ml of Kenalog, 2 ml of bupivacaine and 2 ml of Lidocaine 1% without epinephrine was injected into the trigger points. Dressings were applied to the areas.  The patient tolerated the procedure well and without complication.\par

## 2023-04-21 NOTE — HISTORY OF PRESENT ILLNESS
[de-identified] : 78 year old female presents with acute exacerbation of chronic neck pain. The pain radiates to her right upper extremity. It has progressively worsened over the last two weeks. She saw Dr. Jeter and Dr. Mina in the past.  She has been doing PT without improvement. She has had trigger point injections in the past and would like new injections.  She denies recent illness, fevers, numbness, weakness, balance problems, saddle anesthesia, urinary retention or fecal incontinence.

## 2023-04-21 NOTE — PHYSICAL EXAM
[de-identified] : General: No acute distress, conversant, well-nourished.\par Head: Normocephalic, atraumatic\par Neck: trachea midline, FROM\par Heart: normotensive and normal rate and rhythm\par Lungs: No labored breathing\par Skin: No abrasions, no rashes, no edema\par Psych: Alert and oriented to person, place and time\par Extremities: no peripheral edema or digital cyanosis\par Gait: Normal gait. Can perform tandem gait.  \par Vascular: warm and well perfused distally, palpable distal pulses\par \par MSK:\par Spine: \par No tenderness to palpation.  No step-off, no deformity.\par \par NEURO:\par Sensation \par          Left           \par C5     2/2               \par C6     2/2               \par C7     2/2               \par C8     2/2              \par T1     2/2             \par \par          Right         \par C5     2/2               \par C6     2/2               \par C7     2/2               \par C8     2/2              \par T1     2/2      \par \par Motor: \par                                                Left             \par C5 (deltoid abduction)             5/5               \par C6 (biceps flexion)                   5/5                \par C7 (triceps extension)             5/5               \par C8 (finger flexion)                     5/5               \par T1 (interosseous)                     5/5           \par \par                                                Right           \par C5 (deltoid abduction)             5/5               \par C6 (biceps flexion)                   5/5                \par C7 (triceps extension)             5/5               \par C8 (finger flexion)                     5/5               \par T1 (interosseous)                     5/5                     \par \par Sensation \par Left L2  -  2/2            \par Left L3  -  2/2\par Left L4  -  2/2\par Left L5  -  2/2\par Left S1  -  2/2\par \par Right L2  -  2/2            \par Right L3  -  2/2\par Right L4  -  2/2\par Right L5  -  2/2\par Right S1  -  2/2\par \par Motor: \par Left L2 (hip flexion)                            5/5                \par Left L3 (knee extension)                   5/5                \par Left L4 (ankle dorsiflexion)                 5/5                \par Left L5 (long toe extensor)                5/5                \par Left S1 (ankle plantar flexion)           5/5\par \par Right L2 (hip flexion)                            5/5                \par Right L3 (knee extension)                   5/5                \par Right L4 (ankle dorsiflexion)                 5/5                \par Right L5 (long toe extensor)                5/5                \par Right S1 (ankle plantar flexion)           5/5\par \par Reflexes: Normal and symmetric\par Negative Spurling’s test.  Negative Bowser’s reflex.  \par Negative clonus.  Down-going Babinski. [de-identified] : Cervical xrays (8/7/23): There is 4 mm anterolisthesis of C4 on C5 on flexion which reduces on extension. There is 2 mm anterolisthesis of C2 on C3, C5 on C6 and C6 on C7 without evidence of dynamic instability. The vertebral body heights are maintained. There is mild to moderate disc space narrowing C3-4 through C6-7 with anterior osteophytes. There is left neural foraminal narrowing most prominent at C3-4 and on the right at C5-6 and C6-7. The prevertebral soft tissues are not thickened.

## 2023-04-21 NOTE — ASSESSMENT
[FreeTextEntry1] : 78 year old female presents with acute exacerbation of chronic neck pain. The pain radiates to her right upper extremity. It has progressively worsened over the last two weeks. She saw Dr. Jeter and Dr. Mina in the past.  She has been doing PT without improvement. She is otherwise neurologically intact.  She was given trigger point injections today which she tolerated well. She will be sent for a cervical MRI. She can continue PT. She was given cyclobenzaprine. She will followup after her MRI. We discussed red flag symptoms that would require emergent evaluation. She knows to call with any questions or concerns or if her symptoms acutely worsen.

## 2023-04-22 RX ORDER — HYALURONATE SOD, CROSS-LINKED 30 MG/3 ML
30 SYRINGE (ML) INTRAARTICULAR
Qty: 2 | Refills: 0 | Status: ACTIVE | OUTPATIENT
Start: 2023-04-22

## 2023-04-27 ENCOUNTER — APPOINTMENT (OUTPATIENT)
Dept: PHYSICAL MEDICINE AND REHAB | Facility: CLINIC | Age: 78
End: 2023-04-27

## 2023-04-27 DIAGNOSIS — G25.89 OTHER SPECIFIED EXTRAPYRAMIDAL AND MOVEMENT DISORDERS: ICD-10-CM

## 2023-04-27 DIAGNOSIS — R29.3 ABNORMAL POSTURE: ICD-10-CM

## 2023-04-28 ENCOUNTER — APPOINTMENT (OUTPATIENT)
Dept: PHYSICAL MEDICINE AND REHAB | Facility: CLINIC | Age: 78
End: 2023-04-28
Payer: COMMERCIAL

## 2023-04-28 DIAGNOSIS — M75.51 BURSITIS OF RIGHT SHOULDER: ICD-10-CM

## 2023-04-28 PROBLEM — G25.89 SCAPULAR DYSKINESIS: Status: ACTIVE | Noted: 2023-04-07

## 2023-04-28 PROBLEM — R29.3 POSTURAL IMBALANCE: Status: ACTIVE | Noted: 2023-04-07

## 2023-04-28 PROCEDURE — 99443: CPT

## 2023-05-01 ENCOUNTER — APPOINTMENT (OUTPATIENT)
Dept: ORTHOPEDIC SURGERY | Facility: CLINIC | Age: 78
End: 2023-05-01
Payer: COMMERCIAL

## 2023-05-01 PROCEDURE — 20611 DRAIN/INJ JOINT/BURSA W/US: CPT | Mod: 50

## 2023-05-01 PROCEDURE — 99213 OFFICE O/P EST LOW 20 MIN: CPT | Mod: 25

## 2023-05-01 NOTE — DISCUSSION/SUMMARY
[de-identified] : Patient has demonstrated limited relief from NSAIDS, rest, exercises / PT , and after discussion of the risks and benefits, the patient has elected to proceed with a\par n ULTRASOUND GUIDED VISCO SUPPLEMENT injection into the BILATERAL SupeLat PF Knee\par  \par Confirmed that the patient does not have history of prior adverse reactions, active, infections, or relevant allergies. There was no effusion, erythema, or warmth, and the skin was clear\par \par The skin was sterilized with alcohol. Ethyl Chloride was used as a topical anesthetic. Routine sterile technique. \par  \par The KNEE JOINT was injected utilizing ULTRASOUND GUIDANCE with MONOVISC 4 CC. The injection was completed without complication and a bandage was applied.\par \par The patient tolerated the procedure well and was given post-injection instructions.Rec: Cold therapy, analgesics, avoid heavy activity.\par \par MEDICATION: MONOVISC 4 CC\par \par EFFUSION ASPIRATED : NONE

## 2023-05-01 NOTE — HISTORY OF PRESENT ILLNESS
[de-identified] : BILATERAL KNEE PAIN\par FOLLOW UP \par RIGHT WORSE THEN LEFT\par PAIN LEVEL 9/10 \par MONOVISC TODAY-PROVIDED BY INSURANCE \par

## 2023-05-01 NOTE — PROCEDURE
[de-identified] : Patient has demonstrated limited relief from NSAIDS, rest, exercises / PT , and after discussion of the risks and benefits, the patient has elected to proceed with a\par n ULTRASOUND GUIDED VISCOSUPPLEMENT injection into the BILATERAL SupeLat PF Knee\par  \par Confirmed that the patient does not have history of prior adverse reactions, active, infections, or relevant allergies. There was no effusion, erythema, or warmth, and the skin was clear\par \par The skin was sterilized with alcohol. Ethyl Chloride was used as a topical anesthetic. Routine sterile technique. \par  \par The KNEE JOINT was injected utilizing ULTRASOUND GUIDANCE with MONOVISC 4CC. The injection was completed without complication and a bandage was applied.\par \par The patient tolerated the procedure well and was given post-injection instructions.Rec: Cold therapy, analgesics, avoid heavy activity.\par \par MEDICATION: MONOVISC 4CC\par \par EFFUSION ASPIRATED : NONE

## 2023-05-01 NOTE — PHYSICAL EXAM
[de-identified] : PHYSICAL EXAM BILATERAL KNEES\par \par PAIN AND TENDERNESS LATERAL JOINT RADIATES DOWN LEG \par \par AROM\par RIGHT 10- 125 \par LEFT 0- 130\par \par SPECIAL TESTS\par \par PATELLAR GRIND = GRIND \par DRAWER = NEG\par LACHMAN = NEG\par MACMURRAY = POS RIGHT KNEE \par \par MOTOR = GROSSLY INTACT\par SENSORY = GROSSLY INTACT \par \par \par DISTAL CMS INTACT \par \par

## 2023-05-17 ENCOUNTER — APPOINTMENT (OUTPATIENT)
Dept: ORTHOPEDIC SURGERY | Facility: CLINIC | Age: 78
End: 2023-05-17
Payer: COMMERCIAL

## 2023-05-17 DIAGNOSIS — M54.12 RADICULOPATHY, CERVICAL REGION: ICD-10-CM

## 2023-05-17 PROCEDURE — 20553 NJX 1/MLT TRIGGER POINTS 3/>: CPT

## 2023-05-17 PROCEDURE — 99214 OFFICE O/P EST MOD 30 MIN: CPT | Mod: 25

## 2023-05-17 NOTE — ASSESSMENT
[FreeTextEntry1] : 78 year old female followup with acute exacerbation of chronic neck pain. The pain radiates to her right upper extremity. It has progressively worsened over the last two weeks. She is otherwise neurologically intact. We reviewed her cervical MRI.  It shows degenerative changes without compression of the neural elements. We discussed treatment options including physical therapy, medications, and spinal injections.  She was given trigger point injections which she tolerated well.  The patient was given a referral for consideration for spinal injections with Dr. Sanchez.  The patient was given a referral for physical therapy.  She was given ibuprofen 800. She will followup in 4 weeks. We discussed red flag symptoms that would require emergent evaluation. She knows to call with any questions or concerns or if her symptoms acutely worsen.

## 2023-05-17 NOTE — PHYSICAL EXAM
[de-identified] : General: No acute distress, conversant, well-nourished.\par Head: Normocephalic, atraumatic\par Neck: trachea midline, FROM\par Heart: normotensive and normal rate and rhythm\par Lungs: No labored breathing\par Skin: No abrasions, no rashes, no edema\par Psych: Alert and oriented to person, place and time\par Extremities: no peripheral edema or digital cyanosis\par Gait: Normal gait. Can perform tandem gait.  \par Vascular: warm and well perfused distally, palpable distal pulses\par \par MSK:\par Spine: \par No tenderness to palpation.  No step-off, no deformity.\par \par NEURO:\par Sensation \par          Left           \par C5     2/2               \par C6     2/2               \par C7     2/2               \par C8     2/2              \par T1     2/2             \par \par          Right         \par C5     2/2               \par C6     2/2               \par C7     2/2               \par C8     2/2              \par T1     2/2      \par \par Motor: \par                                                Left             \par C5 (deltoid abduction)             5/5               \par C6 (biceps flexion)                   5/5                \par C7 (triceps extension)             5/5               \par C8 (finger flexion)                     5/5               \par T1 (interosseous)                     5/5           \par \par                                                Right           \par C5 (deltoid abduction)             5/5               \par C6 (biceps flexion)                   5/5                \par C7 (triceps extension)             5/5               \par C8 (finger flexion)                     5/5               \par T1 (interosseous)                     5/5                     \par \par Sensation \par Left L2  -  2/2            \par Left L3  -  2/2\par Left L4  -  2/2\par Left L5  -  2/2\par Left S1  -  2/2\par \par Right L2  -  2/2            \par Right L3  -  2/2\par Right L4  -  2/2\par Right L5  -  2/2\par Right S1  -  2/2\par \par Motor: \par Left L2 (hip flexion)                            5/5                \par Left L3 (knee extension)                   5/5                \par Left L4 (ankle dorsiflexion)                 5/5                \par Left L5 (long toe extensor)                5/5                \par Left S1 (ankle plantar flexion)           5/5\par \par Right L2 (hip flexion)                            5/5                \par Right L3 (knee extension)                   5/5                \par Right L4 (ankle dorsiflexion)                 5/5                \par Right L5 (long toe extensor)                5/5                \par Right S1 (ankle plantar flexion)           5/5\par \par Reflexes: Normal and symmetric\par Negative Spurling’s test.  Negative Bowser’s reflex.  \par Negative clonus.  Down-going Babinski. [de-identified] : Cervical MRI (5/6/23): Multilevel degenerative changes of the cervical spine contributing to mild thecal sac impingement at C4-5, C5-6, and C6-7.\par Moderate to severe right and mild left foraminal stenosis at C4-5, and mild to moderate at other levels as noted.\par Multilevel facet arthrosis, worst on the right at C4-5, and on the left at C3-4 and C6-7.\par Multilevel grade 1 degenerative spondylolistheses.\par \par Cervical xrays (8/7/23): There is 4 mm anterolisthesis of C4 on C5 on flexion which reduces on extension. There is 2 mm anterolisthesis of C2 on C3, C5 on C6 and C6 on C7 without evidence of dynamic instability. The vertebral body heights are maintained. There is mild to moderate disc space narrowing C3-4 through C6-7 with anterior osteophytes. There is left neural foraminal narrowing most prominent at C3-4 and on the right at C5-6 and C6-7. The prevertebral soft tissues are not thickened.

## 2023-05-17 NOTE — PROCEDURE
[de-identified] : Procedure: Trigger Point Injections with corticosteroid (4 muscles)\par Pre-Procedure Diagnosis: Neck pain\par Post-Procedure Diagnosis: same\par \par The patient was educated about the risks and benefits of a corticosteroid injection.  Alternatives were discussed.  The patient understood and consented for the procedure.\par \par The area was sterilely prepped using isopropyl alcohol.  An ethyl chloride spray provided  local anesthesia.  Using the usual sterile technique, 1 ml of 40mg/1ml of Kenalog, 2 ml of bupivacaine and 2 ml of Lidocaine 1% without epinephrine was injected into the trigger points. Dressings were applied to the areas.  The patient tolerated the procedure well and without complication.\par

## 2023-05-17 NOTE — HISTORY OF PRESENT ILLNESS
[de-identified] : 78 year old female followup with acute exacerbation of chronic neck pain. The pain radiates to her right upper extremity. It has progressively worsened over the last two weeks. She saw Dr. Jeter and Dr. Mina in the past.  She denies recent illness, fevers, numbness, weakness, balance problems, saddle anesthesia, urinary retention or fecal incontinence.  Since her last visit the pain has worsened. She is here to review her cervical MRI. She would like trigger point injections.

## 2023-05-23 ENCOUNTER — APPOINTMENT (OUTPATIENT)
Dept: PAIN MANAGEMENT | Facility: CLINIC | Age: 78
End: 2023-05-23

## 2023-06-06 ENCOUNTER — APPOINTMENT (OUTPATIENT)
Dept: PHYSICAL MEDICINE AND REHAB | Facility: CLINIC | Age: 78
End: 2023-06-06

## 2023-06-07 ENCOUNTER — APPOINTMENT (OUTPATIENT)
Dept: PAIN MANAGEMENT | Facility: CLINIC | Age: 78
End: 2023-06-07
Payer: COMMERCIAL

## 2023-06-07 VITALS
HEIGHT: 63 IN | OXYGEN SATURATION: 98 % | DIASTOLIC BLOOD PRESSURE: 85 MMHG | BODY MASS INDEX: 28.35 KG/M2 | SYSTOLIC BLOOD PRESSURE: 162 MMHG | HEART RATE: 105 BPM | WEIGHT: 160 LBS

## 2023-06-07 PROCEDURE — XXXXX: CPT | Mod: 1L

## 2023-06-07 PROCEDURE — 99203 OFFICE O/P NEW LOW 30 MIN: CPT | Mod: 1L

## 2023-07-06 RX ORDER — IBUPROFEN 800 MG/1
800 TABLET ORAL 3 TIMES DAILY
Qty: 90 | Refills: 5 | Status: ACTIVE | COMMUNITY
Start: 2020-08-13 | End: 1900-01-01

## 2023-07-07 ENCOUNTER — APPOINTMENT (OUTPATIENT)
Dept: PAIN MANAGEMENT | Facility: CLINIC | Age: 78
End: 2023-07-07
Payer: COMMERCIAL

## 2023-07-07 VITALS
HEART RATE: 93 BPM | OXYGEN SATURATION: 97 % | RESPIRATION RATE: 18 BRPM | DIASTOLIC BLOOD PRESSURE: 72 MMHG | SYSTOLIC BLOOD PRESSURE: 153 MMHG | HEIGHT: 63 IN | WEIGHT: 170 LBS | BODY MASS INDEX: 30.12 KG/M2

## 2023-07-07 DIAGNOSIS — M47.812 SPONDYLOSIS W/OUT MYELOPATHY OR RADICULOPATHY, CERVICAL REGION: ICD-10-CM

## 2023-07-07 PROCEDURE — 64490 INJ PARAVERT F JNT C/T 1 LEV: CPT

## 2023-07-07 PROCEDURE — 64491 INJ PARAVERT F JNT C/T 2 LEV: CPT | Mod: RT

## 2023-07-07 RX ORDER — IBUPROFEN 800 MG/1
800 TABLET ORAL 3 TIMES DAILY
Qty: 90 | Refills: 5 | Status: ACTIVE | COMMUNITY
Start: 2021-11-11 | End: 1900-01-01

## 2023-07-19 ENCOUNTER — APPOINTMENT (OUTPATIENT)
Dept: PAIN MANAGEMENT | Facility: CLINIC | Age: 78
End: 2023-07-19
Payer: COMMERCIAL

## 2023-07-19 VITALS
SYSTOLIC BLOOD PRESSURE: 191 MMHG | BODY MASS INDEX: 30.12 KG/M2 | OXYGEN SATURATION: 98 % | HEIGHT: 63 IN | HEART RATE: 86 BPM | RESPIRATION RATE: 18 BRPM | DIASTOLIC BLOOD PRESSURE: 94 MMHG | WEIGHT: 170 LBS

## 2023-07-19 DIAGNOSIS — M54.12 RADICULOPATHY, CERVICAL REGION: ICD-10-CM

## 2023-07-19 DIAGNOSIS — M79.18 MYALGIA, OTHER SITE: ICD-10-CM

## 2023-07-19 PROCEDURE — 99214 OFFICE O/P EST MOD 30 MIN: CPT

## 2023-07-20 NOTE — ASSESSMENT
[FreeTextEntry1] : 78-year-old female with chronic neck, pain with radiation to trapezius and shoulders, without radicular symptoms into the distal, arms and hand. She has tried trigger point injections, nonsteroidal, anti-inflammatory drugs, gabapentin, physical therapy with minimal relief.\par \par Plan\par -Recommend bilateral C2 - 5 medial branch blocks and plan for radiofrequency ablation. Should patient have significant relief.

## 2023-07-20 NOTE — HISTORY OF PRESENT ILLNESS
[Neck Pain] : neck pain [___ yrs] : [unfilled] year(s) ago [Constant] : constant [5] : a minimum pain level of 5/10 [7] : a maximum pain level of 7/10 [Dull] : dull [Aching] : aching [Throbbing] : throbbing [Transitioning] : transitioning [Turning Head] : turning head [Laying] : laying [Heat] : heat [Ice] : ice [Rest] : rest [FreeTextEntry1] : Elizabeth Adams is a 78 year old, female with chronic neck pain, predominately axial presents for follow up. She underwent right cervical medial branch blocks C2-C5 on 7/7/23. She experience more than 90% relief in pain and improved range of motion allowing her to turn her out to the right. She now experiences left-sided ask your neck pain without radiation enter left upper extremity. She denies any numbness, tingling, weakness in the bilateral upper extremities. Previous MRI reveals cervical facet arthropathy at C 5–6, C6-7, C7-T1 and moderate to severe right, and mild left foraminal stenosis at C4-5.\par \par [id number]

## 2023-07-20 NOTE — PHYSICAL EXAM
Followed protocol [Normal] : Non-labored breathing, no audible wheezes [Normal muscle bulk without asymmetry] : normal muscle bulk without asymmetry [Normal Spine curvature] : normal spine curvature [Facet Tenderness] : facet tenderness [Paraspinal Tenderness] : paraspinal tenderness [UE] : Sensory: Intact in bilateral upper extremities [Bicep] : biceps 2+ and symmetric bilaterally [Arthritic changes] : no arthritic changes [Spinous Process Tenderness] : no spinous process tenderness [Ataxic] : not ataxic [Antalgic] : not antalgic [Spurling] : negative Spurling's Test [Bowser's Sign] : negative Bowser's Sign [de-identified] : Limited cervical rotation

## 2023-08-02 ENCOUNTER — APPOINTMENT (OUTPATIENT)
Dept: ORTHOPEDIC SURGERY | Facility: CLINIC | Age: 78
End: 2023-08-02
Payer: COMMERCIAL

## 2023-08-02 PROCEDURE — 99213 OFFICE O/P EST LOW 20 MIN: CPT | Mod: 25

## 2023-08-02 PROCEDURE — 20553 NJX 1/MLT TRIGGER POINTS 3/>: CPT

## 2023-08-02 RX ORDER — IBUPROFEN 800 MG/1
800 TABLET, FILM COATED ORAL TWICE DAILY
Qty: 60 | Refills: 5 | Status: ACTIVE | COMMUNITY
Start: 2023-05-17 | End: 1900-01-01

## 2023-08-02 NOTE — DISCUSSION/SUMMARY
[de-identified] : 8X TRIGGER POINT INJECTIONS  COLD PACKS,  PRN  GABAPENTIN 3 TABS DAY  PT - PROGRESS TO HOME EXERCISES.

## 2023-08-02 NOTE — PROCEDURE
[de-identified] : TRIGGER POINT INJECTIONS  Patient has demonstrated limited relief from NSAIDS, rest, exercises / PT, and after discussion of the risks and benefits, the patient elected to proceed with a trigger point injection into the  LEFT PARACERV, LEVATOR and RHOMBOID x2  RIGHT PARACERV,  LEVATOR and RHOMBOID x2   I confirmed no prior adverse reactions, no active infections, and no relevant allergies.  The skin was prepped in the usual sterile manner. The site was injected with local anesthetic followed by local needling.  The injection was completed without complication and a bandage was applied.   The patient tolerated the procedure well and was given post-injection instructions. Cold Tx x 48 hours, analgesics. prn  Medications: 1.5cc of 1% xylocaine + 1.5cc .25% Bupivicaine + KETOROLAC 1.5-3 mg  LOT:[id number] EXPIRATION:03 / 2024 EACH SITE

## 2023-08-02 NOTE — HISTORY OF PRESENT ILLNESS
[de-identified] : BILATERAL SHOULDER PAIN FOLLOW UP JUNE 2023  PATIENT WAS SEEING DR. HORTON-PT HAD A CERVICAL EPIDURAL- NO RELIEF    MAY 17, 2023- TRIGGER POINT INJECTIONS -SOME RELIEF  PREVIOUS TRIGGER POINT INJECTION-JULY 6, 2022 -HELPFUL PREVIOUS TRIGGER POINT INJECTION-NOV 17, 2022- -HELPFUL PAIN LEVEL: 9/10 TRIGGER POINT INJECTION TODAY

## 2023-08-02 NOTE — PHYSICAL EXAM
[de-identified] : PHYSICAL EXAM LEFT AND RIGHT SHOULDER LEFT SCAPULAR PROTRACTION AROM  LEFT 140 / 140 / 90 / 30  RIGHT 140 / 130 / 70 / 20  TENDER: BILATERAL LEVATOR AND RHOMBOID TRIGGER POINTS    SPECIAL TESTING :  IMPINGEMENT SIGNS NEGATIVE    NERI - NEGATIVE  APPREHENSION AND SUPPRESSION - NEGATIVE    RC STRENGTH TESTING  SS: 5/5  SUB 5/5  IS 5/5  BICEPS 5/5    SENSATION - GROSSLY INTACT

## 2023-08-07 ENCOUNTER — APPOINTMENT (OUTPATIENT)
Dept: ORTHOPEDIC SURGERY | Facility: CLINIC | Age: 78
End: 2023-08-07
Payer: COMMERCIAL

## 2023-08-07 PROCEDURE — 20611 DRAIN/INJ JOINT/BURSA W/US: CPT | Mod: 50

## 2023-08-07 PROCEDURE — 99213 OFFICE O/P EST LOW 20 MIN: CPT | Mod: 25

## 2023-08-07 NOTE — DISCUSSION/SUMMARY
[de-identified] : XRAYS REVEAL KNEE OSTEOARTHRITIS LITTLE RELIEF FROM NSAIDS , EXERCISES  PATIENT HAS ELECTED TO PROCEED WITH KENALOG INJECTION KNEE   RISKS AND BENEFITS DISCUSSED - VERBAL CONSENT OBTAINED  SEE PROCEDURE NOTE  POST INJECTION INSTRUCTIONS:  INJECTION THERAPY HANDOUT PROVIDED  COLD THERAPY , ANALGESICS PRN  HOME STRETCHING AND EXERCISES QD  -  KNEE OA HANDOUT  ELASTIC KNEE SUPPORT PRN SUPPORTIVE SHOES WITH ARCH SUPPORT   CONSIDER PHYSICAL THERAPY 2 X 4-6 WEEKS - REFERRAL PROVIDED PROGRESS TO HOME EXERCISES

## 2023-08-07 NOTE — HISTORY OF PRESENT ILLNESS
[de-identified] : CALE KNEE PAIN,   FOLLOW UP  PAIN LEVEL:6/10, RIGHT KNEE IS WORSE   CORTISONE INJECTION TODAY   MONOVISC INJECTION-5/1/2023    PREVIOUS HPI  BILATERAL SHOULDER PAIN FOLLOW UP JUNE 2023 PATIENT WAS SEEING DR. HORTON-PT HAD A CERVICAL EPIDURAL- NO RELIEF MAY 17, 2023- TRIGGER POINT INJECTIONS -SOME RELIEF PREVIOUS TRIGGER POINT INJECTION-JULY 6, 2022 -HELPFUL PREVIOUS TRIGGER POINT INJECTION-NOV 17, 2022- -HELPFUL PAIN LEVEL: 9/10 TRIGGER POINT INJECTION TODAY

## 2023-08-07 NOTE — PHYSICAL EXAM
[de-identified] : PHYSICAL EXAM BILATERAL KNEES PAIN AND TENDERNESS LATERAL JOINT RADIATES DOWN LEG AROM RIGHT 10- 125 LEFT 0- 130  SPECIAL TESTS PATELLAR GRIND = GRIN DRAWER = NEG LACHMAN = NEG  MACMURRAY = POS RIGHT KNEE  MOTOR = GROSSLY INTACT SENSORY = GROSSLY INTACT  DISTAL CMS INTACT

## 2023-08-07 NOTE — PROCEDURE
[de-identified] : INJECTION / ASPIRATION BILATERAL KNEES  Patient has demonstrated limited relief from NSAIDS, rest, exercises / PT , and after discussion of the risks and benefits, the patient has elected to proceed with a n ULTRASOUND GUIDED corticosteroid injection into the BILATERAL SupeLat PF Knee   Confirmed that the patient does not have history of prior adverse reactions, active, infections, or relevant allergies. There was no effusion, erythema, or warmth, and the skin was clear  The skin was sterilized with alcohol. Ethyl Chloride was used as a topical anesthetic. Routine sterile technique.    The KNEE JOINT  was injected utilizing ULTRASOUND GUIDANCEwith KENALOG + LIDOCAINE. The injection was completed without complication and a bandage was applied.  The patient tolerated the procedure well and was given post-injection instructions.Rec: Cold therapy, analgesics, avoid heavy activity.  MEDICATION: Lidocaine 1% 4cc + 10mg of Kenalog LOT# [id number]  EXP 08/24

## 2023-08-30 ENCOUNTER — APPOINTMENT (OUTPATIENT)
Dept: ORTHOPEDIC SURGERY | Facility: CLINIC | Age: 78
End: 2023-08-30
Payer: COMMERCIAL

## 2023-08-30 DIAGNOSIS — M25.512 PAIN IN LEFT SHOULDER: ICD-10-CM

## 2023-08-30 DIAGNOSIS — M54.2 CERVICALGIA: ICD-10-CM

## 2023-08-30 DIAGNOSIS — M25.511 PAIN IN RIGHT SHOULDER: ICD-10-CM

## 2023-08-30 PROCEDURE — 99213 OFFICE O/P EST LOW 20 MIN: CPT | Mod: 25

## 2023-08-30 PROCEDURE — 20553 NJX 1/MLT TRIGGER POINTS 3/>: CPT

## 2023-08-30 NOTE — DISCUSSION/SUMMARY
[de-identified] : PATIENT HAS ELECTED TO PROCEED WITH TRIGGER POINT INJECTIONS SHOULDER  RISKS AND BENEFITS DISCUSSED - VERBAL CONSENT OBTAINED    POST INJECTION INSTRUCTIONS  COLD THERAPY , ANALGESICS PRN  HOME STRETCHING AND EXERCISES QD  - TRIGGER POINTS - neck and shoulder HANDOUT PROVIDED, REVIEWED AND DEMONSTRATED     P.T.  2 WEEKS AFTER INJECTION - 2 X 4 WEEKS

## 2023-08-30 NOTE — PROCEDURE
[de-identified] : TRIGGER POINT INJECTIONS  Patient has demonstrated limited relief from NSAIDS, rest, exercises / PT, and after discussion of the risks and benefits, the patient elected to proceed with a trigger point injection into the LEFT PARACERV, LEVATOR and RHOMBOID x1 RIGHT PARACERV, LEVATOR and RHOMBOID x1   I confirmed no prior adverse reactions, no active infections, and no relevant allergies. The skin was prepped in the usual sterile manner. The site was injected with local anesthetic followed by local needling. The injection was completed without complication and a bandage was applied.  The patient tolerated the procedure well and was given post-injection instructions. Cold Tx x 48 hours, analgesics. prn Medications: 1.5cc of 1% xylocaine + 1.5cc.25% Bupivicaine + KETOROLAC 1.5-3 mg LOT:[id number] EXPIRATION:03 / 2024 EACH SITE.

## 2023-08-30 NOTE — PHYSICAL EXAM
[de-identified] : PHYSICAL EXAM LEFT AND RIGHT SHOULDER LEFT SCAPULAR PROTRACTION AROM  LEFT 140 / 140 / 90 / 30  RIGHT 140 / 130 / 70 / 20  TENDER: BILATERAL LEVATOR AND RHOMBOID TRIGGER POINTS    SPECIAL TESTING :  IMPINGEMENT SIGNS NEGATIVE    NERI - NEGATIVE  APPREHENSION AND SUPPRESSION - NEGATIVE    RC STRENGTH TESTING  SS: 5/5  SUB 5/5  IS 5/5  BICEPS 5/5

## 2023-08-30 NOTE — HISTORY OF PRESENT ILLNESS
[de-identified] : BILATERAL SHOULDER PAIN FOLLOW UP FLARED UP 1 WEEK AGO  PREVIOUS TRIGGER POINT INJECTION-08/02/2023-- -HELPFUL PATIENT WAS SEEING DR. HORTON-PT HAD A CERVICAL EPIDURAL- NO RELIEF MAY 17, 2023- TRIGGER POINT INJECTIONS -SOME RELIEF PREVIOUS TRIGGER POINT INJECTION-JULY 6, 2022 -HELPFUL PREVIOUS TRIGGER POINT INJECTION-NOV 17, 2022- -HELPFUL PAIN LEVEL: 8/10 TRIGGER POINT INJECTION TODAY

## 2023-10-09 PROBLEM — M47.812 CERVICAL FACET SYNDROME: Status: ACTIVE | Noted: 2023-04-07

## 2023-10-09 RX ORDER — HYLAN G-F 20 16MG/2ML
48 SYRINGE (ML) INTRAARTICULAR
Qty: 2 | Refills: 0 | Status: ACTIVE | OUTPATIENT
Start: 2023-10-09

## 2023-11-16 ENCOUNTER — APPOINTMENT (OUTPATIENT)
Dept: ORTHOPEDIC SURGERY | Facility: CLINIC | Age: 78
End: 2023-11-16
Payer: COMMERCIAL

## 2023-11-16 PROCEDURE — 20611 DRAIN/INJ JOINT/BURSA W/US: CPT | Mod: 50

## 2023-11-16 PROCEDURE — 99213 OFFICE O/P EST LOW 20 MIN: CPT | Mod: 25

## 2023-11-16 RX ORDER — GABAPENTIN 100 MG/1
100 CAPSULE ORAL
Qty: 270 | Refills: 3 | Status: ACTIVE | COMMUNITY
Start: 2023-08-09 | End: 1900-01-01

## 2023-12-26 NOTE — PHYSICAL EXAM
[Facet Tenderness] : facet tenderness [UE/LE] : Sensory: Intact in bilateral upper & lower extremities [ALL] : dorsalis pedis, posterior tibial, femoral, popliteal, and radial 2+ and symmetric bilaterally [Cranial Nerves Optic (II)] : visual acuity intact bilaterally,  visual fields full to confrontation, pupils equal round and reactive to light [Cranial Nerves Oculomotor (III)] : extraocular motion intact [Cranial Nerves Trigeminal (V)] : facial sensation intact symmetrically [Cranial Nerves Facial (VII)] : face symmetrical [Cranial Nerves Vestibulocochlear (VIII)] : hearing was intact bilaterally [Cranial Nerves Glossopharyngeal (IX)] : tongue and palate midline [Cranial Nerves Accessory (XI - Cranial And Spinal)] : head turning and shoulder shrug symmetric [Cranial Nerves Hypoglossal (XII)] : there was no tongue deviation with protrusion [Normal] : Normal affect [Spurling] : negative Spurling's Test [Bowser's Sign] : negative Bowser's Sign [de-identified] : cervical facet loading positive

## 2023-12-26 NOTE — HISTORY OF PRESENT ILLNESS
[FreeTextEntry1] : 78 year old female followup with acute exacerbation of chronic neck pain. The pain radiates to her right shoulder but nothing in upper extremity No motor weakness. No bowel/bladder incontinence. Pain is worse with turning and feels stiffness. It has progressively worsened over the last two weeks. She denies recent illness, fevers, numbness, weakness, balance problems, saddle anesthesia, urinary retention or fecal incontinence. Her cervical spine shows facet arthropathy at C5-6, c6-7 and C7-T1 and also has moderate to severe right and mild left foraminal stenosis at C4-5. \par \par

## 2023-12-26 NOTE — ASSESSMENT
[FreeTextEntry1] : 78 year old female followup with acute exacerbation of chronic neck pain. The pain radiates to right shoulder and based on exam, history and imaging, it seems to be facet arthrosis related. She has tried trigger point injections, NSAIDs, gabapentin and PT with mild relief. We will obtain authorization for CMBB bilaterally. In the meantime she can continue ibuprofen and Ice/PT. We discussed her creatinine is fine but she should take NSAIDs with food to prevent ulcers

## 2024-03-28 ENCOUNTER — APPOINTMENT (OUTPATIENT)
Dept: ORTHOPEDIC SURGERY | Facility: CLINIC | Age: 79
End: 2024-03-28
Payer: COMMERCIAL

## 2024-03-28 DIAGNOSIS — M75.101 UNSPECIFIED ROTATOR CUFF TEAR OR RUPTURE OF RIGHT SHOULDER, NOT SPECIFIED AS TRAUMATIC: ICD-10-CM

## 2024-03-28 DIAGNOSIS — M75.102 UNSPECIFIED ROTATOR CUFF TEAR OR RUPTURE OF LEFT SHOULDER, NOT SPECIFIED AS TRAUMATIC: ICD-10-CM

## 2024-03-28 PROCEDURE — 99213 OFFICE O/P EST LOW 20 MIN: CPT | Mod: 25

## 2024-03-28 PROCEDURE — 20611 DRAIN/INJ JOINT/BURSA W/US: CPT | Mod: 50

## 2024-03-28 RX ORDER — HYALURONATE SODIUM, STABILIZED 88 MG/4 ML
88 SYRINGE (ML) INTRAARTICULAR
Qty: 2 | Refills: 0 | Status: ACTIVE | OUTPATIENT
Start: 2024-03-28

## 2024-03-28 NOTE — DISCUSSION/SUMMARY
[de-identified] : XRAYS REVEAL KNEE OSTEOARTHRITIS LITTLE RELIEF FROM NSAIDS , EXERCISES PATIENT HAS ELECTED TO PROCEED WITH KENALOG INJECTION KNEE  RISKS AND BENEFITS DISCUSSED - VERBAL CONSENT OBTAINED SEE PROCEDURE NOTE     POST INJECTION INSTRUCTIONS:   INJECTION THERAPY HANDOUT PROVIDED   COLD THERAPY , ANALGESICS PRN   HOME STRETCHING AND EXERCISES QD  -  KNEE OA HANDOUT ELASTIC KNEE SUPPORT PRN ARCH SUPPORTS OR SUPPORTIVE SHOES WITH ARCH SUPPORT   MONOVISC ORDERED

## 2024-03-28 NOTE — PHYSICAL EXAM
Clinic Care Coordination Contact    Clinical Product Navigator RN reviewed chart; patient on payer product coverage.  Review results: patient has completed her establish-care/Annual Wellness Visit with new PCP.     She is enrolled in Clinic Care Coordination and has not had any ED or hospital readmissions since [facility] discharge.      No new referrals or outreach initiated by RN Clinical Product Navigator.       Sanam Welsh RN/Clinical Product Navigator      
[de-identified] : PHYSICAL EXAM BILATERAL KNEES  PAIN AND TENDERNESS LATERAL JOINT RADIATES DOWN LEG  AROM RIGHT 10- 125 LEFT 0- 130  SPECIAL TESTS PATELLAR GRIND = GRIND DRAWER = NEG LACHMAN = NEG MACMURRAY = POS RIGHT KNEE MOTOR = GROSSLY INTACT SENSORY = GROSSLY INTACT  DISTAL CMS INTACT

## 2024-03-28 NOTE — PROCEDURE
[de-identified] : INJECTION / ASPIRATION BILATERAL KNEES  Patient has demonstrated limited relief from NSAIDS, rest, exercises / PT , and after discussion of the risks and benefits, the patient has elected to proceed with a n ULTRASOUND GUIDED corticosteroid injection into the BILATERAL SupeLat PF Knee   Confirmed that the patient does not have history of prior adverse reactions, active, infections, or relevant allergies. There was no effusion, erythema, or warmth, and the skin was clear  The skin was sterilized with alcohol. Ethyl Chloride was used as a topical anesthetic. Routine sterile technique.    The KNEE JOINT  was injected utilizing ULTRASOUND GUIDANCEwith KENALOG + LIDOCAINE. The injection was completed without complication and a bandage was applied.  The patient tolerated the procedure well and was given post-injection instructions.Rec: Cold therapy, analgesics, avoid heavy activity.  MEDICATION: Lidocaine 1% 4cc + 10mg of Kenalog LOT# [id number]  EXP 08/24   .EFFUSION  RIGHT KNEE ASPIRATE 2CC CLEAR YELLOW FLUID

## 2024-03-28 NOTE — HISTORY OF PRESENT ILLNESS
[de-identified] : CALE KNEE PAIN, FOLLOW UP FLARED UP 1.5 WEEK AGO  PAIN LEVEL:8/10 MONOVISC INJECTION-5/1/2023 MONOVISC INJECTION- 11/16/2023 PT IS REQUESTING CORTISONE INJ      PREVIOUS HPI BILATERAL KNEE PAIN  FOLLOW UP  RIGHT WORSE THEN LEFT  PAIN LEVEL 9/10

## 2024-04-24 ENCOUNTER — APPOINTMENT (OUTPATIENT)
Dept: ORTHOPEDIC SURGERY | Facility: CLINIC | Age: 79
End: 2024-04-24
Payer: COMMERCIAL

## 2024-04-24 DIAGNOSIS — M17.0 BILATERAL PRIMARY OSTEOARTHRITIS OF KNEE: ICD-10-CM

## 2024-04-24 PROCEDURE — 20611 DRAIN/INJ JOINT/BURSA W/US: CPT | Mod: 50

## 2024-04-24 PROCEDURE — 99213 OFFICE O/P EST LOW 20 MIN: CPT | Mod: 25

## 2024-04-24 NOTE — PROCEDURE
[de-identified] : Patient has demonstrated limited relief from NSAIDS, rest, exercises / PT , and after discussion of the risks and benefits, the patient has elected to proceed with a n ULTRASOUND GUIDED VISCOSUPPLEMENT injection into the BILATERAL SupeLat PF Knee   Confirmed that the patient does not have history of prior adverse reactions, active, infections, or relevant allergies. There was no effusion, erythema, or warmth, and the skin was clear  The skin was sterilized with alcohol. Ethyl Chloride was used as a topical anesthetic. Routine sterile technique.    The KNEE JOINT  was injected utilizing ULTRASOUND GUIDANCE with MONOVISC 4CC. The injection was completed without complication and a bandage was applied.  The patient tolerated the procedure well and was given post-injection instructions.Rec: Cold therapy, analgesics, avoid heavy activity.  MEDICATION: MONOVISC 4CC  EFFUSION ASPIRATED  : NONE

## 2024-04-24 NOTE — PHYSICAL EXAM
[de-identified] : PHYSICAL EXAM BILATERAL KNEES  PAIN AND TENDERNESS LATERAL JOINT RADIATES DOWN LEG  AROM RIGHT 10- 125 LEFT 0- 130  SPECIAL TESTS PATELLAR GRIND = GRIND DRAWER = NEG LACHMAN = NEG MACMURRAY = POS RIGHT KNEE MOTOR = GROSSLY INTACT SENSORY = GROSSLY INTACT  DISTAL CMS INTACT

## 2024-04-24 NOTE — DISCUSSION/SUMMARY
[de-identified] : XRAYS REVEAL KNEE OSTEOARTHRITIS LITTLE RELIEF FROM NSAIDS , EXERCISES PATIENT HAS ELECTED TO PROCEED WITH VISCOSUPPLEMENT INJECTION KNEE RISKS AND BENEFITS DISCUSSED - VERBAL CONSENT OBTAINED SEE PROCEDURE NOTE     POST INJECTION INSTRUCTIONS:   NO EXERCISE OR SPORTS 48-72 HOURS   COLD THERAPY , OTC ANALGESICS PRN   HOME STRETCHING AND EXERCISES QD  -   ELASTIC KNEE SUPPORT PRN ARCH SUPPORTS OR SUPPORTIVE SHOES WITH ARCH SUPPORT   CONSDER PHYSICAL THERAPY 2 X 4-6 WEEKS - REFERRAL PROVIDED PROGRESS TO HOME EXERCISES

## 2024-08-12 ENCOUNTER — APPOINTMENT (OUTPATIENT)
Dept: ORTHOPEDIC SURGERY | Facility: CLINIC | Age: 79
End: 2024-08-12

## 2024-08-12 DIAGNOSIS — M17.0 BILATERAL PRIMARY OSTEOARTHRITIS OF KNEE: ICD-10-CM

## 2024-08-12 PROCEDURE — 20611 DRAIN/INJ JOINT/BURSA W/US: CPT | Mod: 50

## 2024-08-12 PROCEDURE — 99213 OFFICE O/P EST LOW 20 MIN: CPT | Mod: 25

## 2024-08-12 NOTE — PHYSICAL EXAM
[de-identified] : PHYSICAL EXAM BILATERAL KNEES  PAIN AND TENDERNESS LATERAL JOINT RADIATES DOWN LEG  AROM RIGHT 10- 125 LEFT 0- 130  SPECIAL TESTS PATELLAR GRIND = GRIND DRAWER = NEG LACHMAN = NEG MACMURRAY = POS RIGHT KNEE MOTOR = GROSSLY INTACT SENSORY = GROSSLY INTACT  DISTAL CMS INTACT

## 2024-08-12 NOTE — DISCUSSION/SUMMARY
[de-identified] : XRAYS REVEAL KNEE OSTEOARTHRITIS LITTLE RELIEF FROM NSAIDS , EXERCISES PATIENT HAS ELECTED TO PROCEED WITH VISCOSUPPLEMENT INJECTION KNEE RISKS AND BENEFITS DISCUSSED - VERBAL CONSENT OBTAINED SEE PROCEDURE NOTE     POST INJECTION INSTRUCTIONS:   NO EXERCISE OR SPORTS 48-72 HOURS   COLD THERAPY , OTC ANALGESICS PRN   HOME STRETCHING AND EXERCISES QD  -   ELASTIC KNEE SUPPORT PRN ARCH SUPPORTS OR SUPPORTIVE SHOES WITH ARCH SUPPORT   MONOVISC FOR OCTOBER 2024  CONSDER PHYSICAL THERAPY 2 X 4-6 WEEKS - AFTER MONOVISC

## 2024-08-12 NOTE — HISTORY OF PRESENT ILLNESS
[de-identified] : CALE KNEE PAIN, FOLLOW UP  PAIN LEVEL:8/10 CORTISONE INJECTION-MARCH 28, 2024-  MONOVISC INJ-APRIL 24, 2024 MONOVISC INJECTION-5/1/2023 MONOVISC INJECTION- 11/16/2023 PT IS REQUESTING CORTISONE INJ      PREVIOUS HPI BILATERAL KNEE PAIN  FOLLOW UP  RIGHT WORSE THEN LEFT  PAIN LEVEL 9/10

## 2024-08-12 NOTE — PROCEDURE
[de-identified] : INJECTION / ASPIRATION BILATERAL KNEES  Patient has demonstrated limited relief from NSAIDS, rest, exercises / PT , and after discussion of the risks and benefits, the patient has elected to proceed with a n ULTRASOUND GUIDED corticosteroid injection into the BILATERAL SupeLat PF Knee   Confirmed that the patient does not have history of prior adverse reactions, active, infections, or relevant allergies. There was no effusion, erythema, or warmth, and the skin was clear  The skin was sterilized with alcohol. Ethyl Chloride was used as a topical anesthetic. Routine sterile technique.    The KNEE JOINT  was injected utilizing ULTRASOUND GUIDANCEwith KENALOG + LIDOCAINE. The injection was completed without complication and a bandage was applied.  The patient tolerated the procedure well and was given post-injection instructions.Rec: Cold therapy, analgesics, avoid heavy activity.  MEDICATION: Lidocaine 1% 4cc + 10mg of Kenalog LOT# [id number]  EXP 01/26

## 2024-09-11 ENCOUNTER — APPOINTMENT (OUTPATIENT)
Dept: ORTHOPEDIC SURGERY | Facility: CLINIC | Age: 79
End: 2024-09-11

## 2024-09-11 DIAGNOSIS — M17.0 BILATERAL PRIMARY OSTEOARTHRITIS OF KNEE: ICD-10-CM

## 2024-09-11 PROCEDURE — 20611 DRAIN/INJ JOINT/BURSA W/US: CPT | Mod: 50

## 2024-09-11 PROCEDURE — 99213 OFFICE O/P EST LOW 20 MIN: CPT | Mod: 25

## 2024-09-11 PROCEDURE — 73562 X-RAY EXAM OF KNEE 3: CPT | Mod: 50

## 2024-09-11 RX ORDER — IBUPROFEN 800 MG/1
800 TABLET ORAL 3 TIMES DAILY
Qty: 90 | Refills: 3 | Status: ACTIVE | COMMUNITY
Start: 2024-09-11 | End: 1900-01-01

## 2024-09-11 RX ORDER — HYALURONATE SODIUM, STABILIZED 88 MG/4 ML
88 SYRINGE (ML) INTRAARTICULAR
Qty: 2 | Refills: 0 | Status: ACTIVE | OUTPATIENT
Start: 2024-09-11

## 2024-09-11 NOTE — PHYSICAL EXAM
[de-identified] : PHYSICAL EXAM BILATERAL KNEES  PAIN AND TENDERNESS LATERAL JOINT RADIATES DOWN LEG  AROM RIGHT 10- 125 LEFT 0- 130  SPECIAL TESTS PATELLAR GRIND = GRIND DRAWER = NEG LACHMAN = NEG MACMURRAY = POS RIGHT KNEE MOTOR = GROSSLY INTACT SENSORY = GROSSLY INTACT  DISTAL CMS INTACT [de-identified] : XRAY RIGHT KNEE: 4 views of the knee GRADE 2-3 OSTEOARTHRITIS  No obvious fracture or dislocation.  Alignment within normal limits   XRAY LEFT KNEE: 4 views of the knee GRADE1 OSTEOARTHRITIS  No obvious fracture or dislocation.  Alignment within normal limits

## 2024-09-11 NOTE — DISCUSSION/SUMMARY
[de-identified] : XRAYS REVEAL KNEE OSTEOARTHRITIS LITTLE RELIEF FROM NSAIDS , EXERCISES PATIENT HAS ELECTED TO PROCEED WITH KETOROLAC INJECTION KNEE RISKS AND BENEFITS DISCUSSED - VERBAL CONSENT OBTAINED SEE PROCEDURE NOTE     POST INJECTION INSTRUCTIONS:   NO EXERCISE OR SPORTS 48-72 HOURS   COLD THERAPY , OTC ANALGESICS PRN   HOME STRETCHING AND EXERCISES QD  -   ELASTIC KNEE SUPPORT PRN ARCH SUPPORTS OR SUPPORTIVE SHOES WITH ARCH SUPPORT   MONOVISC FOR OCTOBER 2024  CONSDER PHYSICAL THERAPY 2 X 4-6 WEEKS - AFTER MONOVISC

## 2024-09-11 NOTE — HISTORY OF PRESENT ILLNESS
[de-identified] : BILATERAL KNEE PAIN  FOLLOW UP  9/7/2024- PT FELL ON LEFT KNEE  PAIN LEVEL: 10/10  CORTISONE INJECTION-MARCH 28, 2024-  MONOVISC INJ-APRIL 24, 2024 MONOVISC INJECTION-5/1/2023 MONOVISC INJECTION- 11/16/2023      PREVIOUS HPI BILATERAL KNEE PAIN  FOLLOW UP  RIGHT WORSE THEN LEFT  PAIN LEVEL 9/10

## 2024-09-11 NOTE — PROCEDURE
[de-identified] : INJECTION / ASPIRATION BILATERAL KNEES  Patient has demonstrated limited relief from NSAIDS, rest, exercises / PT , and after discussion of the risks and benefits, the patient has elected to proceed with a n ULTRASOUND GUIDED corticosteroid injection into the BILATERAL SupeLat PF Knee   Confirmed that the patient does not have history of prior adverse reactions, active, infections, or relevant allergies. There was no effusion, erythema, or warmth, and the skin was clear  The skin was sterilized with alcohol. Ethyl Chloride was used as a topical anesthetic. Routine sterile technique.    The KNEE JOINT  was injected utilizing ULTRASOUND GUIDANCEwith KENALOG + LIDOCAINE. The injection was completed without complication and a bandage was applied.  The patient tolerated the procedure well and was given post-injection instructions.Rec: Cold therapy, analgesics, avoid heavy activity.  MEDICATION: Lidocaine 1% 4cc + KETOROLAC 60mg LOT:[id number] EXPIRATION:06/2025

## 2024-09-26 ENCOUNTER — APPOINTMENT (OUTPATIENT)
Dept: ORTHOPEDIC SURGERY | Facility: CLINIC | Age: 79
End: 2024-09-26
Payer: COMMERCIAL

## 2024-09-26 DIAGNOSIS — M17.0 BILATERAL PRIMARY OSTEOARTHRITIS OF KNEE: ICD-10-CM

## 2024-09-26 PROCEDURE — 99213 OFFICE O/P EST LOW 20 MIN: CPT | Mod: 25

## 2024-09-26 PROCEDURE — 20611 DRAIN/INJ JOINT/BURSA W/US: CPT | Mod: 50

## 2024-09-26 NOTE — DISCUSSION/SUMMARY
[de-identified] : XRAYS REVEAL KNEE OSTEOARTHRITIS LITTLE RELIEF FROM NSAIDS , EXERCISES PATIENT HAS ELECTED TO PROCEED WITH VISCOSUPPLEMENT INJECTION KNEE RISKS AND BENEFITS DISCUSSED - VERBAL CONSENT OBTAINED SEE PROCEDURE NOTE     POST INJECTION INSTRUCTIONS:   NO EXERCISE OR SPORTS 48-72 HOURS   COLD THERAPY , OTC ANALGESICS PRN   HOME STRETCHING AND EXERCISES QD  -  ELASTIC KNEE SUPPORT PRN ARCH SUPPORTS OR SUPPORTIVE SHOES WITH ARCH SUPPORT   CONSDER PHYSICAL THERAPY 2 X 4-6 WEEKS - REFERRAL PROVIDED PROGRESS TO HOME EXERCISES  REPEAT VISCOSUPPLEMENT  6 MONTHS

## 2024-09-26 NOTE — PROCEDURE
[de-identified] : Patient has demonstrated limited relief from NSAIDS, rest, exercises / PT , and after discussion of the risks and benefits, the patient has elected to proceed with a n ULTRASOUND GUIDED VISCOSUPPLEMENT injection into the BILATERAL SupeLat PF Knee   Confirmed that the patient does not have history of prior adverse reactions, active, infections, or relevant allergies. There was no effusion, erythema, or warmth, and the skin was clear  The skin was sterilized with alcohol. Ethyl Chloride was used as a topical anesthetic. Routine sterile technique.    The KNEE JOINT  was injected utilizing ULTRASOUND GUIDANCE with MONOVISC 4CC. The injection was completed without complication and a bandage was applied.  The patient tolerated the procedure well and was given post-injection instructions.Rec: Cold therapy, analgesics, avoid heavy activity.  MEDICATION: MONOVISC 4CC  EFFUSION ASPIRATED  :NONE

## 2024-09-26 NOTE — PHYSICAL EXAM
[de-identified] : PHYSICAL EXAM BILATERAL KNEES  PAIN AND TENDERNESS LATERAL JOINT RADIATES DOWN LEG  AROM RIGHT 10- 125 LEFT 0- 130  SPECIAL TESTS PATELLAR GRIND = GRIND DRAWER = NEG LACHMAN = NEG MACMURRAY = POS RIGHT KNEE MOTOR = GROSSLY INTACT SENSORY = GROSSLY INTACT  DISTAL CMS INTACT

## 2024-09-26 NOTE — HISTORY OF PRESENT ILLNESS
[de-identified] : BILATERAL KNEE PAIN  FOLLOW UP  9/7/2024- PT FELL ON LEFT KNEE  PAIN LEVEL: 10/10  CORTISONE INJECTION-MARCH 28, 2024-  MONOVISC INJ-APRIL 24, 2024 MONOVISC INJECTION-5/1/2023 MONOVISC INJECTION- 11/16/2023  MONOVISC INJECTION TODAY -PROVIDE DBY INSURANCE      PREVIOUS HPI BILATERAL KNEE PAIN  FOLLOW UP  RIGHT WORSE THEN LEFT  PAIN LEVEL 9/10

## 2025-02-03 ENCOUNTER — APPOINTMENT (OUTPATIENT)
Dept: ENDOCRINOLOGY | Facility: CLINIC | Age: 80
End: 2025-02-03

## 2025-02-10 RX ORDER — HYALURONATE SODIUM, STABILIZED 88 MG/4 ML
88 SYRINGE (ML) INTRAARTICULAR
Qty: 2 | Refills: 0 | Status: ACTIVE | OUTPATIENT
Start: 2025-02-10

## 2025-02-13 ENCOUNTER — APPOINTMENT (OUTPATIENT)
Dept: ORTHOPEDIC SURGERY | Facility: CLINIC | Age: 80
End: 2025-02-13

## 2025-02-13 DIAGNOSIS — M77.11 LATERAL EPICONDYLITIS, RIGHT ELBOW: ICD-10-CM

## 2025-02-13 PROCEDURE — 99204 OFFICE O/P NEW MOD 45 MIN: CPT | Mod: 25

## 2025-02-13 PROCEDURE — 99214 OFFICE O/P EST MOD 30 MIN: CPT | Mod: 25

## 2025-02-13 PROCEDURE — 73070 X-RAY EXAM OF ELBOW: CPT | Mod: RT

## 2025-02-13 PROCEDURE — 76942 ECHO GUIDE FOR BIOPSY: CPT

## 2025-02-13 PROCEDURE — 20551 NJX 1 TENDON ORIGIN/INSJ: CPT | Mod: RT

## 2025-02-13 RX ORDER — IBUPROFEN 800 MG/1
800 TABLET ORAL 3 TIMES DAILY
Qty: 90 | Refills: 5 | Status: ACTIVE | COMMUNITY
Start: 2025-02-13 | End: 1900-01-01

## 2025-03-13 ENCOUNTER — APPOINTMENT (OUTPATIENT)
Dept: ORTHOPEDIC SURGERY | Facility: CLINIC | Age: 80
End: 2025-03-13

## 2025-03-13 DIAGNOSIS — M17.0 BILATERAL PRIMARY OSTEOARTHRITIS OF KNEE: ICD-10-CM

## 2025-03-13 PROCEDURE — 20611 DRAIN/INJ JOINT/BURSA W/US: CPT | Mod: 50

## 2025-03-13 PROCEDURE — 99213 OFFICE O/P EST LOW 20 MIN: CPT | Mod: 25

## 2025-05-29 ENCOUNTER — APPOINTMENT (OUTPATIENT)
Dept: ENDOCRINOLOGY | Facility: CLINIC | Age: 80
End: 2025-05-29

## 2025-05-29 VITALS
HEIGHT: 62 IN | DIASTOLIC BLOOD PRESSURE: 68 MMHG | BODY MASS INDEX: 32.76 KG/M2 | HEART RATE: 99 BPM | WEIGHT: 178 LBS | SYSTOLIC BLOOD PRESSURE: 114 MMHG

## 2025-05-29 VITALS — BODY MASS INDEX: 33.09 KG/M2 | HEIGHT: 61.5 IN

## 2025-05-29 DIAGNOSIS — E04.2 NONTOXIC MULTINODULAR GOITER: ICD-10-CM

## 2025-05-29 DIAGNOSIS — M81.0 AGE-RELATED OSTEOPOROSIS W/OUT CURRENT PATHOLOGICAL FRACTURE: ICD-10-CM

## 2025-05-29 DIAGNOSIS — E66.9 OBESITY, UNSPECIFIED: ICD-10-CM

## 2025-05-29 PROCEDURE — 99204 OFFICE O/P NEW MOD 45 MIN: CPT

## 2025-05-29 RX ORDER — ALBUTEROL SULFATE 90 UG/1
108 AEROSOL, METERED RESPIRATORY (INHALATION)
Refills: 0 | Status: ACTIVE | COMMUNITY

## 2025-05-29 RX ORDER — METFORMIN HYDROCHLORIDE 500 MG/1
500 TABLET, COATED ORAL TWICE DAILY
Qty: 60 | Refills: 5 | Status: ACTIVE | COMMUNITY
Start: 2025-05-29 | End: 1900-01-01

## 2025-05-29 RX ORDER — CANDESARTAN 16 MG/1
16 TABLET ORAL
Refills: 0 | Status: ACTIVE | COMMUNITY

## 2025-05-29 RX ORDER — RISEDRONATE SODIUM 35 MG/1
35 TABLET, FILM COATED ORAL
Refills: 0 | Status: ACTIVE | COMMUNITY

## 2025-06-02 LAB
ANION GAP SERPL CALC-SCNC: 16 MMOL/L
BUN SERPL-MCNC: 16 MG/DL
CALCIUM SERPL-MCNC: 9.8 MG/DL
CALCIUM SERPL-MCNC: 9.8 MG/DL
CHLORIDE SERPL-SCNC: 98 MMOL/L
CO2 SERPL-SCNC: 24 MMOL/L
CREAT SERPL-MCNC: 1.07 MG/DL
EGFRCR SERPLBLD CKD-EPI 2021: 52 ML/MIN/1.73M2
GLUCOSE SERPL-MCNC: 106 MG/DL
PARATHYROID HORMONE INTACT: 68 PG/ML
POTASSIUM SERPL-SCNC: 5.1 MMOL/L
SODIUM SERPL-SCNC: 137 MMOL/L
THYROGLOB AB SERPL-ACNC: 15.2 IU/ML
THYROPEROXIDASE AB SERPL IA-ACNC: <9 IU/ML
TSH SERPL-ACNC: 1.38 UIU/ML

## 2025-06-03 LAB — ALP BONE SERPL-MCNC: 7.8 UG/L

## 2025-06-25 ENCOUNTER — NON-APPOINTMENT (OUTPATIENT)
Age: 80
End: 2025-06-25

## 2025-06-26 ENCOUNTER — APPOINTMENT (OUTPATIENT)
Dept: ORTHOPEDIC SURGERY | Facility: CLINIC | Age: 80
End: 2025-06-26

## 2025-06-26 PROCEDURE — 20551 NJX 1 TENDON ORIGIN/INSJ: CPT | Mod: RT

## 2025-06-26 PROCEDURE — 99214 OFFICE O/P EST MOD 30 MIN: CPT | Mod: 25

## 2025-06-26 PROCEDURE — 76942 ECHO GUIDE FOR BIOPSY: CPT

## 2025-07-15 NOTE — REASON FOR VISIT
Spoke to pharmacy. States medication was transferred from home delivery. During transfer medication refills were not sent. Asking for new prescription to be sent over. Order pended for provider signature    [Follow-Up Visit] : a follow-up visit for [Shoulder Pain] : shoulder pain

## 2025-07-23 ENCOUNTER — APPOINTMENT (OUTPATIENT)
Dept: ORTHOPEDIC SURGERY | Facility: CLINIC | Age: 80
End: 2025-07-23
Payer: MEDICARE

## 2025-07-23 DIAGNOSIS — M17.0 BILATERAL PRIMARY OSTEOARTHRITIS OF KNEE: ICD-10-CM

## 2025-07-23 PROCEDURE — 20611 DRAIN/INJ JOINT/BURSA W/US: CPT | Mod: 50

## 2025-07-23 PROCEDURE — 99204 OFFICE O/P NEW MOD 45 MIN: CPT | Mod: 25

## 2025-07-23 PROCEDURE — 99214 OFFICE O/P EST MOD 30 MIN: CPT | Mod: 25

## 2025-08-06 RX ORDER — HYALURONATE SODIUM, STABILIZED 88 MG/4 ML
88 SYRINGE (ML) INTRAARTICULAR
Qty: 2 | Refills: 0 | Status: ACTIVE | OUTPATIENT
Start: 2025-08-06